# Patient Record
Sex: MALE | Race: BLACK OR AFRICAN AMERICAN | NOT HISPANIC OR LATINO | Employment: UNEMPLOYED | URBAN - METROPOLITAN AREA
[De-identification: names, ages, dates, MRNs, and addresses within clinical notes are randomized per-mention and may not be internally consistent; named-entity substitution may affect disease eponyms.]

---

## 2021-04-22 ENCOUNTER — HOSPITAL ENCOUNTER (OUTPATIENT)
Dept: VACCINE CLINIC | Facility: HOSPITAL | Age: 31
Discharge: HOME | End: 2021-04-22
Attending: INTERNAL MEDICINE

## 2021-05-13 ENCOUNTER — HOSPITAL ENCOUNTER (OUTPATIENT)
Dept: VACCINE CLINIC | Facility: HOSPITAL | Age: 31
Discharge: HOME | End: 2021-05-13
Attending: INTERNAL MEDICINE

## 2022-09-15 ENCOUNTER — HOSPITAL ENCOUNTER (EMERGENCY)
Facility: HOSPITAL | Age: 32
Discharge: HOME | End: 2022-09-15
Attending: EMERGENCY MEDICINE | Admitting: EMERGENCY MEDICINE

## 2022-09-15 VITALS
RESPIRATION RATE: 20 BRPM | BODY MASS INDEX: 38.02 KG/M2 | WEIGHT: 250.88 LBS | HEIGHT: 68 IN | HEART RATE: 94 BPM | OXYGEN SATURATION: 97 % | SYSTOLIC BLOOD PRESSURE: 131 MMHG | DIASTOLIC BLOOD PRESSURE: 76 MMHG | TEMPERATURE: 98.3 F

## 2022-09-15 DIAGNOSIS — M25.474 BILATERAL SWELLING OF FEET AND ANKLES: Primary | ICD-10-CM

## 2022-09-15 DIAGNOSIS — M25.471 BILATERAL SWELLING OF FEET AND ANKLES: Primary | ICD-10-CM

## 2022-09-15 DIAGNOSIS — M25.472 BILATERAL SWELLING OF FEET AND ANKLES: Primary | ICD-10-CM

## 2022-09-15 DIAGNOSIS — M25.475 BILATERAL SWELLING OF FEET AND ANKLES: Primary | ICD-10-CM

## 2022-09-15 LAB
ALBUMIN SERPL-MCNC: 4.8 G/DL (ref 3.5–5.2)
ALBUMIN/GLOB SERPL: 1.3 G/DL
ALP SERPL-CCNC: 84 U/L (ref 39–117)
ALT SERPL W P-5'-P-CCNC: 130 U/L (ref 1–41)
ANION GAP SERPL CALCULATED.3IONS-SCNC: 10.9 MMOL/L (ref 5–15)
AST SERPL-CCNC: 74 U/L (ref 1–40)
BASOPHILS # BLD AUTO: 0.03 10*3/MM3 (ref 0–0.2)
BASOPHILS NFR BLD AUTO: 0.4 % (ref 0–1.5)
BILIRUB SERPL-MCNC: 0.5 MG/DL (ref 0–1.2)
BILIRUB UR QL STRIP: NEGATIVE
BUN SERPL-MCNC: 8 MG/DL (ref 6–20)
BUN/CREAT SERPL: 7.6 (ref 7–25)
CALCIUM SPEC-SCNC: 10.3 MG/DL (ref 8.6–10.5)
CHLORIDE SERPL-SCNC: 98 MMOL/L (ref 98–107)
CLARITY UR: CLEAR
CO2 SERPL-SCNC: 26.1 MMOL/L (ref 22–29)
COLOR UR: YELLOW
CREAT SERPL-MCNC: 1.05 MG/DL (ref 0.76–1.27)
DEPRECATED RDW RBC AUTO: 47.1 FL (ref 37–54)
EGFRCR SERPLBLD CKD-EPI 2021: 96.7 ML/MIN/1.73
EOSINOPHIL # BLD AUTO: 0.15 10*3/MM3 (ref 0–0.4)
EOSINOPHIL NFR BLD AUTO: 2.1 % (ref 0.3–6.2)
ERYTHROCYTE [DISTWIDTH] IN BLOOD BY AUTOMATED COUNT: 13.2 % (ref 12.3–15.4)
GLOBULIN UR ELPH-MCNC: 3.6 GM/DL
GLUCOSE SERPL-MCNC: 120 MG/DL (ref 65–99)
GLUCOSE UR STRIP-MCNC: NEGATIVE MG/DL
HCT VFR BLD AUTO: 50.1 % (ref 37.5–51)
HGB BLD-MCNC: 17.8 G/DL (ref 13–17.7)
HGB UR QL STRIP.AUTO: NEGATIVE
HOLD SPECIMEN: 11
HOLD SPECIMEN: 11
IMM GRANULOCYTES # BLD AUTO: 0.03 10*3/MM3 (ref 0–0.05)
IMM GRANULOCYTES NFR BLD AUTO: 0.4 % (ref 0–0.5)
KETONES UR QL STRIP: NEGATIVE
LEUKOCYTE ESTERASE UR QL STRIP.AUTO: NEGATIVE
LIPASE SERPL-CCNC: 31 U/L (ref 13–60)
LYMPHOCYTES # BLD AUTO: 1.64 10*3/MM3 (ref 0.7–3.1)
LYMPHOCYTES NFR BLD AUTO: 22.6 % (ref 19.6–45.3)
MCH RBC QN AUTO: 34.5 PG (ref 26.6–33)
MCHC RBC AUTO-ENTMCNC: 35.5 G/DL (ref 31.5–35.7)
MCV RBC AUTO: 97.1 FL (ref 79–97)
MONOCYTES # BLD AUTO: 0.46 10*3/MM3 (ref 0.1–0.9)
MONOCYTES NFR BLD AUTO: 6.3 % (ref 5–12)
NEUTROPHILS NFR BLD AUTO: 4.95 10*3/MM3 (ref 1.7–7)
NEUTROPHILS NFR BLD AUTO: 68.2 % (ref 42.7–76)
NITRITE UR QL STRIP: NEGATIVE
NRBC BLD AUTO-RTO: 0 /100 WBC (ref 0–0.2)
NT-PROBNP SERPL-MCNC: <5 PG/ML (ref 0–450)
PH UR STRIP.AUTO: 7.5 [PH] (ref 5–8)
PLATELET # BLD AUTO: 193 10*3/MM3 (ref 140–450)
PMV BLD AUTO: 10.2 FL (ref 6–12)
POTASSIUM SERPL-SCNC: 3.9 MMOL/L (ref 3.5–5.2)
PROT SERPL-MCNC: 8.4 G/DL (ref 6–8.5)
PROT UR QL STRIP: NEGATIVE
RBC # BLD AUTO: 5.16 10*6/MM3 (ref 4.14–5.8)
SODIUM SERPL-SCNC: 135 MMOL/L (ref 136–145)
SP GR UR STRIP: 1.01 (ref 1–1.03)
UROBILINOGEN UR QL STRIP: NORMAL
WBC NRBC COR # BLD: 7.26 10*3/MM3 (ref 3.4–10.8)
WHOLE BLOOD HOLD COAG: 11
WHOLE BLOOD HOLD SPECIMEN: 11

## 2022-09-15 PROCEDURE — 85025 COMPLETE CBC W/AUTO DIFF WBC: CPT

## 2022-09-15 PROCEDURE — 81003 URINALYSIS AUTO W/O SCOPE: CPT

## 2022-09-15 PROCEDURE — 99283 EMERGENCY DEPT VISIT LOW MDM: CPT

## 2022-09-15 PROCEDURE — P9612 CATHETERIZE FOR URINE SPEC: HCPCS

## 2022-09-15 PROCEDURE — 83880 ASSAY OF NATRIURETIC PEPTIDE: CPT

## 2022-09-15 PROCEDURE — 83690 ASSAY OF LIPASE: CPT

## 2022-09-15 PROCEDURE — 80053 COMPREHEN METABOLIC PANEL: CPT

## 2022-09-15 PROCEDURE — 36415 COLL VENOUS BLD VENIPUNCTURE: CPT

## 2022-09-15 NOTE — DISCHARGE INSTRUCTIONS
Please drink lots of water and decrease your sodium intake  Please elevate your legs on pillows at heart level    Please follow-up with your PCP in 3 days    If any worsening symptoms or new Concerns please return to the ED

## 2022-09-15 NOTE — ED PROVIDER NOTES
Subjective   History of Present Illness  Patient is a 32-year-old male presenting today due to bilateral feet swelling x1 day.  Patient states that he eats a lot of food high in sodium daily.  Yesterday he was telling his mother that his feet were swelling and she gave him one of her Lasix pills.  Patient denies a history of heart failure, liver disease or CKD.  He states he has a history of anxiety.  Patient states that he has been smoking a pack per day for over 14 years.   He denies abdominal pain.  He denies fever, chills, nausea or vomiting.    History provided by:  Patient   used: No        Review of Systems   Constitutional: Negative.    HENT: Negative.    Eyes: Negative.    Respiratory: Negative.    Cardiovascular: Positive for leg swelling (ankle/feet).   Gastrointestinal: Negative.    Endocrine: Negative.    Genitourinary: Negative.    Musculoskeletal: Negative.    Skin: Negative.    Allergic/Immunologic: Negative.    Neurological: Negative.    Hematological: Negative.    Psychiatric/Behavioral: Negative.        Past Medical History:   Diagnosis Date   • Anxiety        No Known Allergies    No past surgical history on file.    No family history on file.    Social History     Socioeconomic History   • Marital status: Single   Tobacco Use   • Smoking status: Current Every Day Smoker     Packs/day: 1.00     Types: Cigarettes   • Smokeless tobacco: Never Used   Substance and Sexual Activity   • Alcohol use: Not Currently   • Drug use: Never   • Sexual activity: Defer           Objective   Physical Exam  Vitals and nursing note reviewed.   Constitutional:       Appearance: Normal appearance. He is normal weight.   HENT:      Head: Normocephalic and atraumatic.      Nose: Nose normal.      Mouth/Throat:      Mouth: Mucous membranes are moist.   Eyes:      Extraocular Movements: Extraocular movements intact.      Conjunctiva/sclera: Conjunctivae normal.      Pupils: Pupils are equal, round,  and reactive to light.   Cardiovascular:      Rate and Rhythm: Normal rate and regular rhythm.      Pulses: Normal pulses.      Heart sounds: Normal heart sounds.   Pulmonary:      Effort: Pulmonary effort is normal.      Breath sounds: Normal breath sounds.   Musculoskeletal:         General: Swelling present. No tenderness or signs of injury. Normal range of motion.      Cervical back: Normal range of motion and neck supple.      Right ankle: No swelling. Normal pulse.      Left ankle: No swelling. Normal pulse.      Right foot: No swelling.      Left foot: No swelling.   Skin:     General: Skin is warm and dry.   Neurological:      General: No focal deficit present.      Mental Status: He is alert and oriented to person, place, and time.   Psychiatric:         Mood and Affect: Mood normal.         Behavior: Behavior normal.         Procedures           ED Course                                           MDM  Number of Diagnoses or Management Options  Diagnosis management comments: I have spoken with patient. I have explained the patient´s condition, diagnoses and treatment plan based on the information available to me at this time. I have answered the patient's questions and addressed any concerns. The patient has a good  understanding of the patient´s diagnosis, condition, and treatment plan as can be expected at this point. The vital signs have been stable. The patient´s condition is stable and appropriate for discharge from the emergency department.      The patient will pursue further outpatient evaluation with the primary care physician or other designated or consulting physician as outlined in the discharge instructions. They are agreeable to this plan of care and follow-up instructions have been explained in detail. The patient has received these instructions in written format and have expressed an understanding of the discharge instructions. The patient is aware that any significant change in condition or  worsening of symptoms should prompt an immediate return to this or the closest emergency department or call to 911.         Amount and/or Complexity of Data Reviewed  Clinical lab tests: reviewed  Tests in the medicine section of CPT®: reviewed  Decide to obtain previous medical records or to obtain history from someone other than the patient: yes    Risk of Complications, Morbidity, and/or Mortality  Presenting problems: low  Diagnostic procedures: low  Management options: low    Patient Progress  Patient progress: stable      Final diagnoses:   Bilateral swelling of feet and ankles       ED Disposition  ED Disposition     ED Disposition   Discharge    Condition   Stable    Comment   --             Stephanie Abel, APRN  [address] [address]  [phone]      If symptoms worsen         Medication List      No changes were made to your prescriptions during this visit.          Rebel Augustin PAPATTI  09/15/22 1002

## 2022-11-14 ENCOUNTER — TRANSCRIBE ORDERS (OUTPATIENT)
Dept: LAB | Facility: HOSPITAL | Age: 32
End: 2022-11-14

## 2022-11-14 ENCOUNTER — LAB (OUTPATIENT)
Dept: LAB | Facility: HOSPITAL | Age: 32
End: 2022-11-14

## 2022-11-14 DIAGNOSIS — F20.89 OTHER SCHIZOPHRENIA: ICD-10-CM

## 2022-11-14 DIAGNOSIS — G47.00 INSOMNIA, UNSPECIFIED TYPE: ICD-10-CM

## 2022-11-14 DIAGNOSIS — F20.89 OTHER SCHIZOPHRENIA: Primary | ICD-10-CM

## 2022-11-14 LAB
ALBUMIN SERPL-MCNC: 4.1 G/DL (ref 3.5–5.2)
ALBUMIN/GLOB SERPL: 1.2 G/DL
ALP SERPL-CCNC: 88 U/L (ref 39–117)
ALT SERPL W P-5'-P-CCNC: 91 U/L (ref 1–41)
ANION GAP SERPL CALCULATED.3IONS-SCNC: 14.1 MMOL/L (ref 5–15)
AST SERPL-CCNC: 65 U/L (ref 1–40)
BASOPHILS # BLD AUTO: 0.05 10*3/MM3 (ref 0–0.2)
BASOPHILS NFR BLD AUTO: 0.7 % (ref 0–1.5)
BILIRUB SERPL-MCNC: 1 MG/DL (ref 0–1.2)
BUN SERPL-MCNC: 4 MG/DL (ref 6–20)
BUN/CREAT SERPL: 4.7 (ref 7–25)
CALCIUM SPEC-SCNC: 9.5 MG/DL (ref 8.6–10.5)
CHLORIDE SERPL-SCNC: 101 MMOL/L (ref 98–107)
CHOLEST SERPL-MCNC: 125 MG/DL (ref 0–200)
CO2 SERPL-SCNC: 25.9 MMOL/L (ref 22–29)
CREAT SERPL-MCNC: 0.86 MG/DL (ref 0.76–1.27)
DEPRECATED RDW RBC AUTO: 43.3 FL (ref 37–54)
EGFRCR SERPLBLD CKD-EPI 2021: 118 ML/MIN/1.73
EOSINOPHIL # BLD AUTO: 0.13 10*3/MM3 (ref 0–0.4)
EOSINOPHIL NFR BLD AUTO: 1.9 % (ref 0.3–6.2)
ERYTHROCYTE [DISTWIDTH] IN BLOOD BY AUTOMATED COUNT: 13 % (ref 12.3–15.4)
GLOBULIN UR ELPH-MCNC: 3.3 GM/DL
GLUCOSE SERPL-MCNC: 108 MG/DL (ref 65–99)
HCT VFR BLD AUTO: 47.2 % (ref 37.5–51)
HDLC SERPL-MCNC: 29 MG/DL (ref 40–60)
HGB BLD-MCNC: 16.8 G/DL (ref 13–17.7)
IMM GRANULOCYTES # BLD AUTO: 0.02 10*3/MM3 (ref 0–0.05)
IMM GRANULOCYTES NFR BLD AUTO: 0.3 % (ref 0–0.5)
LDLC SERPL CALC-MCNC: 46 MG/DL (ref 0–100)
LDLC/HDLC SERPL: 1.06 {RATIO}
LYMPHOCYTES # BLD AUTO: 1.32 10*3/MM3 (ref 0.7–3.1)
LYMPHOCYTES NFR BLD AUTO: 19.8 % (ref 19.6–45.3)
MCH RBC QN AUTO: 32.7 PG (ref 26.6–33)
MCHC RBC AUTO-ENTMCNC: 35.6 G/DL (ref 31.5–35.7)
MCV RBC AUTO: 92 FL (ref 79–97)
MONOCYTES # BLD AUTO: 0.41 10*3/MM3 (ref 0.1–0.9)
MONOCYTES NFR BLD AUTO: 6.1 % (ref 5–12)
NEUTROPHILS NFR BLD AUTO: 4.74 10*3/MM3 (ref 1.7–7)
NEUTROPHILS NFR BLD AUTO: 71.2 % (ref 42.7–76)
NRBC BLD AUTO-RTO: 0 /100 WBC (ref 0–0.2)
PLATELET # BLD AUTO: 166 10*3/MM3 (ref 140–450)
PMV BLD AUTO: 11.6 FL (ref 6–12)
POTASSIUM SERPL-SCNC: 3.7 MMOL/L (ref 3.5–5.2)
PROT SERPL-MCNC: 7.4 G/DL (ref 6–8.5)
RBC # BLD AUTO: 5.13 10*6/MM3 (ref 4.14–5.8)
SODIUM SERPL-SCNC: 141 MMOL/L (ref 136–145)
T4 FREE SERPL-MCNC: 1.41 NG/DL (ref 0.93–1.7)
TRIGL SERPL-MCNC: 327 MG/DL (ref 0–150)
TSH SERPL DL<=0.05 MIU/L-ACNC: 1.2 UIU/ML (ref 0.27–4.2)
VLDLC SERPL-MCNC: 50 MG/DL (ref 5–40)
WBC NRBC COR # BLD: 6.67 10*3/MM3 (ref 3.4–10.8)

## 2022-11-14 PROCEDURE — 84439 ASSAY OF FREE THYROXINE: CPT

## 2022-11-14 PROCEDURE — 84443 ASSAY THYROID STIM HORMONE: CPT

## 2022-11-14 PROCEDURE — 36415 COLL VENOUS BLD VENIPUNCTURE: CPT

## 2022-11-14 PROCEDURE — 85025 COMPLETE CBC W/AUTO DIFF WBC: CPT

## 2022-11-14 PROCEDURE — 80061 LIPID PANEL: CPT

## 2022-11-14 PROCEDURE — 80053 COMPREHEN METABOLIC PANEL: CPT

## 2023-01-01 ENCOUNTER — APPOINTMENT (OUTPATIENT)
Dept: CT IMAGING | Facility: HOSPITAL | Age: 33
End: 2023-01-01
Payer: COMMERCIAL

## 2023-01-01 ENCOUNTER — APPOINTMENT (OUTPATIENT)
Dept: CARDIOLOGY | Facility: HOSPITAL | Age: 33
End: 2023-01-01
Payer: COMMERCIAL

## 2023-01-01 ENCOUNTER — APPOINTMENT (OUTPATIENT)
Dept: GENERAL RADIOLOGY | Facility: HOSPITAL | Age: 33
End: 2023-01-01
Payer: COMMERCIAL

## 2023-01-01 ENCOUNTER — APPOINTMENT (OUTPATIENT)
Dept: MRI IMAGING | Facility: HOSPITAL | Age: 33
End: 2023-01-01
Payer: COMMERCIAL

## 2023-01-01 ENCOUNTER — HOSPITAL ENCOUNTER (INPATIENT)
Facility: HOSPITAL | Age: 33
LOS: 3 days | End: 2023-12-17
Attending: EMERGENCY MEDICINE | Admitting: STUDENT IN AN ORGANIZED HEALTH CARE EDUCATION/TRAINING PROGRAM
Payer: COMMERCIAL

## 2023-01-01 ENCOUNTER — APPOINTMENT (OUTPATIENT)
Dept: NUCLEAR MEDICINE | Facility: HOSPITAL | Age: 33
End: 2023-01-01
Payer: COMMERCIAL

## 2023-01-01 VITALS
DIASTOLIC BLOOD PRESSURE: 71 MMHG | BODY MASS INDEX: 41.52 KG/M2 | WEIGHT: 264.55 LBS | RESPIRATION RATE: 24 BRPM | HEIGHT: 67 IN | TEMPERATURE: 98.5 F | SYSTOLIC BLOOD PRESSURE: 107 MMHG | HEART RATE: 51 BPM | OXYGEN SATURATION: 93 %

## 2023-01-01 DIAGNOSIS — T17.320A CHOKING DUE TO FOOD IN LARYNX, INITIAL ENCOUNTER: ICD-10-CM

## 2023-01-01 DIAGNOSIS — I48.91 ATRIAL FIBRILLATION WITH RAPID VENTRICULAR RESPONSE: ICD-10-CM

## 2023-01-01 DIAGNOSIS — I25.9 MYOCARDIAL ISCHEMIA: ICD-10-CM
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
## 2023-01-01 DIAGNOSIS — I49.3 FREQUENT PVCS: ICD-10-CM

## 2023-01-01 DIAGNOSIS — I46.9 CARDIAC ARREST: Primary | ICD-10-CM

## 2023-01-01 DIAGNOSIS — E87.20 ACIDOSIS, UNSPECIFIED: ICD-10-CM

## 2023-01-01 LAB
ACB CMPLX DNA BAL NAA+NON-PRB-NCNCRNG: NOT DETECTED
ALBUMIN SERPL-MCNC: 3.1 G/DL (ref 3.5–5.2)
ALBUMIN SERPL-MCNC: 3.2 G/DL (ref 3.5–5.2)
ALBUMIN SERPL-MCNC: 3.3 G/DL (ref 3.5–5.2)
ALBUMIN/GLOB SERPL: 1.1 G/DL
ALBUMIN/GLOB SERPL: 1.3 G/DL
ALP SERPL-CCNC: 64 U/L (ref 39–117)
ALP SERPL-CCNC: 66 U/L (ref 39–117)
ALP SERPL-CCNC: 81 U/L (ref 39–117)
ALT SERPL W P-5'-P-CCNC: 38 U/L (ref 1–41)
ALT SERPL W P-5'-P-CCNC: 61 U/L (ref 1–41)
ALT SERPL W P-5'-P-CCNC: 62 U/L (ref 1–41)
ANION GAP SERPL CALCULATED.3IONS-SCNC: 10.3 MMOL/L (ref 5–15)
ANION GAP SERPL CALCULATED.3IONS-SCNC: 10.5 MMOL/L (ref 5–15)
ANION GAP SERPL CALCULATED.3IONS-SCNC: 10.8 MMOL/L (ref 5–15)
ANION GAP SERPL CALCULATED.3IONS-SCNC: 11 MMOL/L (ref 5–15)
ANION GAP SERPL CALCULATED.3IONS-SCNC: 13.3 MMOL/L (ref 5–15)
ANION GAP SERPL CALCULATED.3IONS-SCNC: 22.4 MMOL/L (ref 5–15)
ANION GAP SERPL CALCULATED.3IONS-SCNC: 8.5 MMOL/L (ref 5–15)
ANION GAP SERPL CALCULATED.3IONS-SCNC: 9.7 MMOL/L (ref 5–15)
ANION GAP SERPL CALCULATED.3IONS-SCNC: 9.9 MMOL/L (ref 5–15)
ARTERIAL PATENCY WRIST A: ABNORMAL
ARTERIAL PATENCY WRIST A: POSITIVE
ASCENDING AORTA: 3.2 CM
AST SERPL-CCNC: 35 U/L (ref 1–40)
AST SERPL-CCNC: 38 U/L (ref 1–40)
AST SERPL-CCNC: 67 U/L (ref 1–40)
BACTERIA SPEC AEROBE CULT: ABNORMAL
BACTERIA SPEC AEROBE CULT: ABNORMAL
BACTERIA SPEC RESP CULT: NORMAL
BASE EXCESS BLDA CALC-SCNC: -13.8 MMOL/L (ref -2–2)
BASE EXCESS BLDA CALC-SCNC: -19.7 MMOL/L (ref -2–2)
BASE EXCESS BLDA CALC-SCNC: -4.6 MMOL/L (ref -2–2)
BASE EXCESS BLDA CALC-SCNC: -5.3 MMOL/L (ref -2–2)
BASE EXCESS BLDA CALC-SCNC: -5.4 MMOL/L (ref -2–2)
BASOPHILS # BLD AUTO: 0.03 10*3/MM3 (ref 0–0.2)
BASOPHILS # BLD AUTO: 0.09 10*3/MM3 (ref 0–0.2)
BASOPHILS NFR BLD AUTO: 0.4 % (ref 0–1.5)
BASOPHILS NFR BLD AUTO: 0.9 % (ref 0–1.5)
BDY SITE: ABNORMAL
BH CV ECHO MEAS - AO MAX PG: 3 MMHG
BH CV ECHO MEAS - AO MEAN PG: 2 MMHG
BH CV ECHO MEAS - AO ROOT DIAM: 3.7 CM
BH CV ECHO MEAS - AO V2 MAX: 83 CM/SEC
BH CV ECHO MEAS - AO V2 VTI: 17.4 CM
BH CV ECHO MEAS - AVA(I,D): 2.08 CM2
BH CV ECHO MEAS - EDV(CUBED): 113.4 ML
BH CV ECHO MEAS - EDV(MOD-SP2): 84.5 ML
BH CV ECHO MEAS - EDV(MOD-SP4): 116 ML
BH CV ECHO MEAS - EF(MOD-SP2): 41.3 %
BH CV ECHO MEAS - EF(MOD-SP4): 47.3 %
BH CV ECHO MEAS - ESV(CUBED): 60.2 ML
BH CV ECHO MEAS - ESV(MOD-SP2): 49.6 ML
BH CV ECHO MEAS - ESV(MOD-SP4): 61.1 ML
BH CV ECHO MEAS - FS: 19 %
BH CV ECHO MEAS - IVS/LVPW: 1.13 CM
BH CV ECHO MEAS - IVSD: 1.48 CM
BH CV ECHO MEAS - LA DIMENSION: 3.6 CM
BH CV ECHO MEAS - LAT PEAK E' VEL: 5.6 CM/SEC
BH CV ECHO MEAS - LV MASS(C)D: 275.8 GRAMS
BH CV ECHO MEAS - LV MAX PG: 1.35 MMHG
BH CV ECHO MEAS - LV MEAN PG: 1 MMHG
BH CV ECHO MEAS - LV V1 MAX: 58.1 CM/SEC
BH CV ECHO MEAS - LV V1 VTI: 11.5 CM
BH CV ECHO MEAS - LVIDD: 4.8 CM
BH CV ECHO MEAS - LVIDS: 3.9 CM
BH CV ECHO MEAS - LVOT AREA: 3.1 CM2
BH CV ECHO MEAS - LVOT DIAM: 2 CM
BH CV ECHO MEAS - LVPWD: 1.31 CM
BH CV ECHO MEAS - MED PEAK E' VEL: 5 CM/SEC
BH CV ECHO MEAS - MV A MAX VEL: 72.4 CM/SEC
BH CV ECHO MEAS - MV DEC SLOPE: 300.5 CM/SEC2
BH CV ECHO MEAS - MV DEC TIME: 0.19 SEC
BH CV ECHO MEAS - MV E MAX VEL: 56.6 CM/SEC
BH CV ECHO MEAS - MV E/A: 0.78
BH CV ECHO MEAS - MV MAX PG: 2.8 MMHG
BH CV ECHO MEAS - MV MEAN PG: 1 MMHG
BH CV ECHO MEAS - MV P1/2T: 76.2 MSEC
BH CV ECHO MEAS - MV V2 VTI: 28.2 CM
BH CV ECHO MEAS - MVA(P1/2T): 2.9 CM2
BH CV ECHO MEAS - MVA(VTI): 1.28 CM2
BH CV ECHO MEAS - RVDD: 2.26 CM
BH CV ECHO MEAS - SV(LVOT): 36.1 ML
BH CV ECHO MEAS - SV(MOD-SP2): 34.9 ML
BH CV ECHO MEAS - SV(MOD-SP4): 54.9 ML
BH CV ECHO MEASUREMENTS AVERAGE E/E' RATIO: 10.68
BILIRUB CONJ SERPL-MCNC: 3.7 MG/DL (ref 0–0.3)
BILIRUB INDIRECT SERPL-MCNC: 0.4 MG/DL
BILIRUB SERPL-MCNC: 0.3 MG/DL (ref 0–1.2)
BILIRUB SERPL-MCNC: 1.2 MG/DL (ref 0–1.2)
BILIRUB SERPL-MCNC: 4.1 MG/DL (ref 0–1.2)
BILIRUB UR QL STRIP: NEGATIVE
BLACTX-M ISLT/SPM QL: NOT DETECTED
BLAIMP ISLT/SPM QL: NOT DETECTED
BLAKPC ISLT/SPM QL: NOT DETECTED
BLAOXA-48-LIKE ISLT/SPM QL: NOT DETECTED
BLAVIM ISLT/SPM QL: NOT DETECTED
BUN SERPL-MCNC: 11 MG/DL (ref 6–20)
BUN SERPL-MCNC: 4 MG/DL (ref 6–20)
BUN SERPL-MCNC: 5 MG/DL (ref 6–20)
BUN SERPL-MCNC: 6 MG/DL (ref 6–20)
BUN SERPL-MCNC: 7 MG/DL (ref 6–20)
BUN SERPL-MCNC: 7 MG/DL (ref 6–20)
BUN SERPL-MCNC: 8 MG/DL (ref 6–20)
BUN/CREAT SERPL: 12.5 (ref 7–25)
BUN/CREAT SERPL: 3.5 (ref 7–25)
BUN/CREAT SERPL: 4.2 (ref 7–25)
BUN/CREAT SERPL: 5.3 (ref 7–25)
BUN/CREAT SERPL: 6.3 (ref 7–25)
BUN/CREAT SERPL: 6.4 (ref 7–25)
BUN/CREAT SERPL: 7 (ref 7–25)
BUN/CREAT SERPL: 7.7 (ref 7–25)
BUN/CREAT SERPL: 7.9 (ref 7–25)
C PNEUM DNA NPH QL NAA+NON-PROBE: NOT DETECTED
CA-I BLDA-SCNC: 1.15 MMOL/L (ref 1.13–1.32)
CA-I BLDA-SCNC: 1.17 MMOL/L (ref 1.13–1.32)
CA-I BLDA-SCNC: 1.2 MMOL/L (ref 1.13–1.32)
CA-I BLDA-SCNC: 1.23 MMOL/L (ref 1.13–1.32)
CALCIUM SPEC-SCNC: 10.1 MG/DL (ref 8.6–10.5)
CALCIUM SPEC-SCNC: 8.2 MG/DL (ref 8.6–10.5)
CALCIUM SPEC-SCNC: 8.2 MG/DL (ref 8.6–10.5)
CALCIUM SPEC-SCNC: 8.3 MG/DL (ref 8.6–10.5)
CALCIUM SPEC-SCNC: 8.3 MG/DL (ref 8.6–10.5)
CALCIUM SPEC-SCNC: 8.4 MG/DL (ref 8.6–10.5)
CALCIUM SPEC-SCNC: 8.5 MG/DL (ref 8.6–10.5)
CALCIUM SPEC-SCNC: 8.6 MG/DL (ref 8.6–10.5)
CALCIUM SPEC-SCNC: 8.7 MG/DL (ref 8.6–10.5)
CHLORIDE BLDA-SCNC: 100 MMOL/L (ref 98–106)
CHLORIDE BLDA-SCNC: 108 MMOL/L (ref 98–106)
CHLORIDE BLDA-SCNC: 109 MMOL/L (ref 98–106)
CHLORIDE BLDA-SCNC: 115 MMOL/L (ref 98–106)
CHLORIDE SERPL-SCNC: 102 MMOL/L (ref 98–107)
CHLORIDE SERPL-SCNC: 106 MMOL/L (ref 98–107)
CHLORIDE SERPL-SCNC: 108 MMOL/L (ref 98–107)
CHLORIDE SERPL-SCNC: 110 MMOL/L (ref 98–107)
CHLORIDE SERPL-SCNC: 116 MMOL/L (ref 98–107)
CHLORIDE SERPL-SCNC: 118 MMOL/L (ref 98–107)
CHLORIDE SERPL-SCNC: 120 MMOL/L (ref 98–107)
CLARITY UR: CLEAR
CO2 SERPL-SCNC: 14.6 MMOL/L (ref 22–29)
CO2 SERPL-SCNC: 20.7 MMOL/L (ref 22–29)
CO2 SERPL-SCNC: 22.2 MMOL/L (ref 22–29)
CO2 SERPL-SCNC: 22.5 MMOL/L (ref 22–29)
CO2 SERPL-SCNC: 22.5 MMOL/L (ref 22–29)
CO2 SERPL-SCNC: 22.7 MMOL/L (ref 22–29)
CO2 SERPL-SCNC: 23 MMOL/L (ref 22–29)
CO2 SERPL-SCNC: 23.1 MMOL/L (ref 22–29)
CO2 SERPL-SCNC: 23.3 MMOL/L (ref 22–29)
COHGB MFR BLD: 0.1 % (ref 0–1.5)
COHGB MFR BLD: 0.2 % (ref 0–1.5)
COHGB MFR BLD: 0.3 % (ref 0–1.5)
COHGB MFR BLD: 1.9 % (ref 0–1.5)
COHGB MFR BLD: 6.1 % (ref 0–1.5)
COLOR UR: YELLOW
CREAT SERPL-MCNC: 0.88 MG/DL (ref 0.76–1.27)
CREAT SERPL-MCNC: 1 MG/DL (ref 0.76–1.27)
CREAT SERPL-MCNC: 1.01 MG/DL (ref 0.76–1.27)
CREAT SERPL-MCNC: 1.04 MG/DL (ref 0.76–1.27)
CREAT SERPL-MCNC: 1.09 MG/DL (ref 0.76–1.27)
CREAT SERPL-MCNC: 1.13 MG/DL (ref 0.76–1.27)
CREAT SERPL-MCNC: 1.15 MG/DL (ref 0.76–1.27)
CREAT SERPL-MCNC: 1.19 MG/DL (ref 0.76–1.27)
CREAT SERPL-MCNC: 1.26 MG/DL (ref 0.76–1.27)
D-LACTATE SERPL-SCNC: 0.9 MMOL/L (ref 0.5–2)
D-LACTATE SERPL-SCNC: 1.9 MMOL/L (ref 0.5–2)
D-LACTATE SERPL-SCNC: 12.1 MMOL/L (ref 0.5–2)
D-LACTATE SERPL-SCNC: 2.1 MMOL/L (ref 0.5–2)
D-LACTATE SERPL-SCNC: 2.5 MMOL/L (ref 0.5–2)
D-LACTATE SERPL-SCNC: 7 MMOL/L (ref 0.5–2)
DEPRECATED RDW RBC AUTO: 46.5 FL (ref 37–54)
DEPRECATED RDW RBC AUTO: 49.3 FL (ref 37–54)
DEPRECATED RDW RBC AUTO: 51.8 FL (ref 37–54)
DEPRECATED RDW RBC AUTO: 57.1 FL (ref 37–54)
E CLOAC COMP DNA BAL NAA+NON-PRB-NCNCRNG: DETECTED
E COLI DNA BAL NAA+NON-PRB-NCNCRNG: NOT DETECTED
EGFRCR SERPLBLD CKD-EPI 2021: 100.7 ML/MIN/1.73
EGFRCR SERPLBLD CKD-EPI 2021: 101.9 ML/MIN/1.73
EGFRCR SERPLBLD CKD-EPI 2021: 116.4 ML/MIN/1.73
EGFRCR SERPLBLD CKD-EPI 2021: 77.2 ML/MIN/1.73
EGFRCR SERPLBLD CKD-EPI 2021: 82.7 ML/MIN/1.73
EGFRCR SERPLBLD CKD-EPI 2021: 86.2 ML/MIN/1.73
EGFRCR SERPLBLD CKD-EPI 2021: 88 ML/MIN/1.73
EGFRCR SERPLBLD CKD-EPI 2021: 91.9 ML/MIN/1.73
EGFRCR SERPLBLD CKD-EPI 2021: 97.2 ML/MIN/1.73
EOSINOPHIL # BLD AUTO: 0.14 10*3/MM3 (ref 0–0.4)
EOSINOPHIL # BLD AUTO: 0.16 10*3/MM3 (ref 0–0.4)
EOSINOPHIL NFR BLD AUTO: 1.4 % (ref 0.3–6.2)
EOSINOPHIL NFR BLD AUTO: 2.1 % (ref 0.3–6.2)
ERYTHROCYTE [DISTWIDTH] IN BLOOD BY AUTOMATED COUNT: 13 % (ref 12.3–15.4)
ERYTHROCYTE [DISTWIDTH] IN BLOOD BY AUTOMATED COUNT: 13.4 % (ref 12.3–15.4)
ERYTHROCYTE [DISTWIDTH] IN BLOOD BY AUTOMATED COUNT: 13.5 % (ref 12.3–15.4)
ERYTHROCYTE [DISTWIDTH] IN BLOOD BY AUTOMATED COUNT: 15.9 % (ref 12.3–15.4)
FHHB: 1.3 % (ref 0–5)
FHHB: 2 % (ref 0–5)
FHHB: 24.9 % (ref 0–5)
FHHB: 3.9 % (ref 0–5)
FHHB: 4.1 % (ref 0–5)
FLUAV SUBTYP SPEC NAA+PROBE: NOT DETECTED
FLUBV RNA ISLT QL NAA+PROBE: NOT DETECTED
GAS FLOW AIRWAY: ABNORMAL L/MIN
GEN 5 2HR TROPONIN T REFLEX: 122 NG/L
GLOBULIN UR ELPH-MCNC: 2.5 GM/DL
GLOBULIN UR ELPH-MCNC: 2.9 GM/DL
GLUCOSE BLDA-MCNC: 119 MG/DL (ref 70–99)
GLUCOSE BLDA-MCNC: 371 MG/DL (ref 70–99)
GLUCOSE BLDA-MCNC: 82 MG/DL (ref 70–99)
GLUCOSE BLDA-MCNC: 98 MG/DL (ref 70–99)
GLUCOSE BLDC GLUCOMTR-MCNC: 104 MG/DL (ref 70–99)
GLUCOSE BLDC GLUCOMTR-MCNC: 106 MG/DL (ref 70–99)
GLUCOSE BLDC GLUCOMTR-MCNC: 113 MG/DL (ref 70–99)
GLUCOSE BLDC GLUCOMTR-MCNC: 115 MG/DL (ref 70–99)
GLUCOSE BLDC GLUCOMTR-MCNC: 118 MG/DL (ref 70–99)
GLUCOSE BLDC GLUCOMTR-MCNC: 119 MG/DL (ref 70–99)
GLUCOSE BLDC GLUCOMTR-MCNC: 120 MG/DL (ref 70–99)
GLUCOSE BLDC GLUCOMTR-MCNC: 123 MG/DL (ref 70–99)
GLUCOSE BLDC GLUCOMTR-MCNC: 127 MG/DL (ref 70–99)
GLUCOSE BLDC GLUCOMTR-MCNC: 128 MG/DL (ref 70–99)
GLUCOSE BLDC GLUCOMTR-MCNC: 129 MG/DL (ref 70–99)
GLUCOSE BLDC GLUCOMTR-MCNC: 136 MG/DL (ref 70–99)
GLUCOSE BLDC GLUCOMTR-MCNC: 155 MG/DL (ref 70–99)
GLUCOSE BLDC GLUCOMTR-MCNC: 99 MG/DL (ref 70–99)
GLUCOSE SERPL-MCNC: 101 MG/DL (ref 65–99)
GLUCOSE SERPL-MCNC: 115 MG/DL (ref 65–99)
GLUCOSE SERPL-MCNC: 120 MG/DL (ref 65–99)
GLUCOSE SERPL-MCNC: 121 MG/DL (ref 65–99)
GLUCOSE SERPL-MCNC: 121 MG/DL (ref 65–99)
GLUCOSE SERPL-MCNC: 122 MG/DL (ref 65–99)
GLUCOSE SERPL-MCNC: 122 MG/DL (ref 65–99)
GLUCOSE SERPL-MCNC: 146 MG/DL (ref 65–99)
GLUCOSE SERPL-MCNC: 338 MG/DL (ref 65–99)
GLUCOSE UR STRIP-MCNC: NEGATIVE MG/DL
GP B STREP DNA BAL NAA+NON-PRB-NCNCRNG: DETECTED
GRAM STN SPEC: ABNORMAL
GRAM STN SPEC: NORMAL
GRAM STN SPEC: NORMAL
HADV DNA SPEC NAA+PROBE: NOT DETECTED
HAEM INFLU DNA BAL NAA+NON-PRB-NCNCRNG: NOT DETECTED
HCO3 BLDA-SCNC: 12.7 MMOL/L (ref 22–26)
HCO3 BLDA-SCNC: 18.3 MMOL/L (ref 22–26)
HCO3 BLDA-SCNC: 18.6 MMOL/L (ref 22–26)
HCO3 BLDA-SCNC: 20.3 MMOL/L (ref 22–26)
HCO3 BLDA-SCNC: 21.2 MMOL/L (ref 22–26)
HCOV RNA LOWER RESP QL NAA+NON-PROBE: NOT DETECTED
HCT VFR BLD AUTO: 47 % (ref 37.5–51)
HCT VFR BLD AUTO: 47.5 % (ref 37.5–51)
HCT VFR BLD AUTO: 49.4 % (ref 37.5–51)
HCT VFR BLD AUTO: 51.5 % (ref 37.5–51)
HCT VFR BLD AUTO: 54.7 % (ref 37.5–51)
HGB BLD-MCNC: 14.8 G/DL (ref 13–17.7)
HGB BLD-MCNC: 15.5 G/DL (ref 13–17.7)
HGB BLD-MCNC: 16.1 G/DL (ref 13–17.7)
HGB BLD-MCNC: 16.6 G/DL (ref 13–17.7)
HGB BLD-MCNC: 18.7 G/DL (ref 13–17.7)
HGB BLDA-MCNC: 14.7 G/DL (ref 13.8–16.4)
HGB BLDA-MCNC: 17.3 G/DL (ref 13.8–16.4)
HGB BLDA-MCNC: 18.6 G/DL (ref 13.8–16.4)
HGB BLDA-MCNC: 19.4 G/DL (ref 13.8–16.4)
HGB BLDA-MCNC: 19.9 G/DL (ref 13.8–16.4)
HGB UR QL STRIP.AUTO: NEGATIVE
HMPV RNA NPH QL NAA+NON-PROBE: NOT DETECTED
HOLD SPECIMEN: NORMAL
HOLD SPECIMEN: NORMAL
HPIV RNA LOWER RESP QL NAA+NON-PROBE: NOT DETECTED
IMM GRANULOCYTES # BLD AUTO: 0.02 10*3/MM3 (ref 0–0.05)
IMM GRANULOCYTES # BLD AUTO: 0.29 10*3/MM3 (ref 0–0.05)
IMM GRANULOCYTES NFR BLD AUTO: 0.3 % (ref 0–0.5)
IMM GRANULOCYTES NFR BLD AUTO: 3 % (ref 0–0.5)
INHALED O2 CONCENTRATION: 100 %
INHALED O2 CONCENTRATION: 100 %
INHALED O2 CONCENTRATION: 40 %
IVRT: 108 MS
K AEROGENES DNA BAL NAA+NON-PRB-NCNCRNG: NOT DETECTED
K OXYTOCA DNA BAL NAA+NON-PRB-NCNCRNG: NOT DETECTED
K PNEU GRP DNA BAL NAA+NON-PRB-NCNCRNG: NOT DETECTED
KETONES UR QL STRIP: NEGATIVE
L PNEUMO DNA LOWER RESP QL NAA+NON-PROBE: NOT DETECTED
LACTATE BLDA-SCNC: 0.84 MMOL/L (ref 0.5–2)
LACTATE BLDA-SCNC: 12.41 MMOL/L (ref 0.5–2)
LACTATE BLDA-SCNC: 2.25 MMOL/L (ref 0.5–2)
LACTATE BLDA-SCNC: 2.25 MMOL/L (ref 0.5–2)
LACTATE BLDA-SCNC: ABNORMAL MMOL/L
LEUKOCYTE ESTERASE UR QL STRIP.AUTO: NEGATIVE
LYMPHOCYTES # BLD AUTO: 1 10*3/MM3 (ref 0.7–3.1)
LYMPHOCYTES # BLD AUTO: 3.11 10*3/MM3 (ref 0.7–3.1)
LYMPHOCYTES NFR BLD AUTO: 13 % (ref 19.6–45.3)
LYMPHOCYTES NFR BLD AUTO: 32.2 % (ref 19.6–45.3)
M CATARRHALIS DNA BAL NAA+NON-PRB-NCNCRNG: NOT DETECTED
M PNEUMO IGG SER IA-ACNC: NOT DETECTED
MAGNESIUM SERPL-MCNC: 1.5 MG/DL (ref 1.6–2.6)
MAGNESIUM SERPL-MCNC: 2 MG/DL (ref 1.6–2.6)
MAGNESIUM SERPL-MCNC: 2.4 MG/DL (ref 1.6–2.6)
MCH RBC QN AUTO: 31.9 PG (ref 26.6–33)
MCH RBC QN AUTO: 32 PG (ref 26.6–33)
MCH RBC QN AUTO: 32.4 PG (ref 26.6–33)
MCH RBC QN AUTO: 32.7 PG (ref 26.6–33)
MCHC RBC AUTO-ENTMCNC: 31.5 G/DL (ref 31.5–35.7)
MCHC RBC AUTO-ENTMCNC: 32.2 G/DL (ref 31.5–35.7)
MCHC RBC AUTO-ENTMCNC: 32.6 G/DL (ref 31.5–35.7)
MCHC RBC AUTO-ENTMCNC: 32.6 G/DL (ref 31.5–35.7)
MCV RBC AUTO: 100.2 FL (ref 79–97)
MCV RBC AUTO: 102.8 FL (ref 79–97)
MCV RBC AUTO: 98 FL (ref 79–97)
MCV RBC AUTO: 99.4 FL (ref 79–97)
MECA+MECC ISLT/SPM QL: ABNORMAL
METHGB BLD QL: 0.1 % (ref 0–1.5)
METHGB BLD QL: 0.3 % (ref 0–1.5)
METHGB BLD QL: 0.5 % (ref 0–1.5)
METHGB BLD QL: 0.5 % (ref 0–1.5)
METHGB BLD QL: 0.7 % (ref 0–1.5)
MODALITY: ABNORMAL
MONOCYTES # BLD AUTO: 0.51 10*3/MM3 (ref 0.1–0.9)
MONOCYTES # BLD AUTO: 0.85 10*3/MM3 (ref 0.1–0.9)
MONOCYTES NFR BLD AUTO: 6.6 % (ref 5–12)
MONOCYTES NFR BLD AUTO: 8.8 % (ref 5–12)
NDM GENE: NOT DETECTED
NEUTROPHILS NFR BLD AUTO: 5.19 10*3/MM3 (ref 1.7–7)
NEUTROPHILS NFR BLD AUTO: 5.98 10*3/MM3 (ref 1.7–7)
NEUTROPHILS NFR BLD AUTO: 53.7 % (ref 42.7–76)
NEUTROPHILS NFR BLD AUTO: 77.6 % (ref 42.7–76)
NITRITE UR QL STRIP: NEGATIVE
NOTE: ABNORMAL
NRBC BLD AUTO-RTO: 0 /100 WBC (ref 0–0.2)
NRBC BLD AUTO-RTO: 0 /100 WBC (ref 0–0.2)
NT-PROBNP SERPL-MCNC: <36 PG/ML (ref 0–450)
OSMOLALITY SERPL: 295 MOSM/KG (ref 275–300)
OSMOLALITY SERPL: 298 MOSM/KG (ref 275–300)
OSMOLALITY SERPL: 300 MOSM/KG (ref 275–300)
OSMOLALITY SERPL: 302 MOSM/KG (ref 275–300)
OSMOLALITY SERPL: 306 MOSM/KG (ref 275–300)
OSMOLALITY SERPL: 307 MOSM/KG (ref 275–300)
OSMOLALITY SERPL: 309 MOSM/KG (ref 275–300)
OSMOLALITY SERPL: 309 MOSM/KG (ref 275–300)
OSMOLALITY SERPL: 311 MOSM/KG (ref 275–300)
OSMOLALITY SERPL: 313 MOSM/KG (ref 275–300)
OSMOLALITY SERPL: 314 MOSM/KG (ref 275–300)
OSMOLALITY SERPL: 315 MOSM/KG (ref 275–300)
OSMOLALITY UR: 643 MOSM/KG
OXYHGB MFR BLDV: 68.9 % (ref 94–99)
OXYHGB MFR BLDV: 95.1 % (ref 94–99)
OXYHGB MFR BLDV: 95.6 % (ref 94–99)
OXYHGB MFR BLDV: 95.6 % (ref 94–99)
OXYHGB MFR BLDV: 97.9 % (ref 94–99)
P AERUGINOSA DNA BAL NAA+NON-PRB-NCNCRNG: NOT DETECTED
PCO2 BLDA: 124.8 MM HG (ref 35–45)
PCO2 BLDA: 30.1 MM HG (ref 35–45)
PCO2 BLDA: 32.3 MM HG (ref 35–45)
PCO2 BLDA: 39.9 MM HG (ref 35–45)
PCO2 BLDA: 45.1 MM HG (ref 35–45)
PEEP RESPIRATORY: 10 CM[H2O]
PH BLDA: 6.79 PH UNITS (ref 7.35–7.45)
PH BLDA: 7.21 PH UNITS (ref 7.35–7.45)
PH BLDA: 7.29 PH UNITS (ref 7.35–7.45)
PH BLDA: 7.32 PH UNITS (ref 7.35–7.45)
PH BLDA: 7.4 PH UNITS (ref 7.35–7.45)
PH UR STRIP.AUTO: 5.5 [PH] (ref 5–8)
PHOSPHATE SERPL-MCNC: 1.9 MG/DL (ref 2.5–4.5)
PHOSPHATE SERPL-MCNC: 3.5 MG/DL (ref 2.5–4.5)
PHOSPHATE SERPL-MCNC: 4.1 MG/DL (ref 2.5–4.5)
PLATELET # BLD AUTO: 126 10*3/MM3 (ref 140–450)
PLATELET # BLD AUTO: 143 10*3/MM3 (ref 140–450)
PLATELET # BLD AUTO: 150 10*3/MM3 (ref 140–450)
PLATELET # BLD AUTO: 152 10*3/MM3 (ref 140–450)
PMV BLD AUTO: 10.7 FL (ref 6–12)
PMV BLD AUTO: 10.9 FL (ref 6–12)
PMV BLD AUTO: 11.1 FL (ref 6–12)
PMV BLD AUTO: 11.3 FL (ref 6–12)
PO2 BLD: 213 MM[HG] (ref 0–500)
PO2 BLD: 367 MM[HG] (ref 0–500)
PO2 BLD: 488 MM[HG] (ref 0–500)
PO2 BLD: 62 MM[HG] (ref 0–500)
PO2 BLD: 96 MM[HG] (ref 0–500)
PO2 BLDA: 146.9 MM HG (ref 80–100)
PO2 BLDA: 195.3 MM HG (ref 80–100)
PO2 BLDA: 62.4 MM HG (ref 80–100)
PO2 BLDA: 85.2 MM HG (ref 80–100)
PO2 BLDA: 95.9 MM HG (ref 80–100)
POTASSIUM BLDA-SCNC: 2.69 MMOL/L (ref 3.5–5)
POTASSIUM BLDA-SCNC: 3.36 MMOL/L (ref 3.5–5)
POTASSIUM BLDA-SCNC: 3.87 MMOL/L (ref 3.5–5)
POTASSIUM BLDA-SCNC: 4.3 MMOL/L (ref 3.5–5)
POTASSIUM SERPL-SCNC: 3.3 MMOL/L (ref 3.5–5.2)
POTASSIUM SERPL-SCNC: 3.4 MMOL/L (ref 3.5–5.2)
POTASSIUM SERPL-SCNC: 3.5 MMOL/L (ref 3.5–5.2)
POTASSIUM SERPL-SCNC: 3.5 MMOL/L (ref 3.5–5.2)
POTASSIUM SERPL-SCNC: 3.7 MMOL/L (ref 3.5–5.2)
POTASSIUM SERPL-SCNC: 3.7 MMOL/L (ref 3.5–5.2)
POTASSIUM SERPL-SCNC: 3.9 MMOL/L (ref 3.5–5.2)
POTASSIUM SERPL-SCNC: 3.9 MMOL/L (ref 3.5–5.2)
POTASSIUM SERPL-SCNC: 4 MMOL/L (ref 3.5–5.2)
PROCALCITONIN SERPL-MCNC: 0.39 NG/ML (ref 0–0.25)
PROT SERPL-MCNC: 5.7 G/DL (ref 6–8.5)
PROT SERPL-MCNC: 6.2 G/DL (ref 6–8.5)
PROT SERPL-MCNC: 6.2 G/DL (ref 6–8.5)
PROT UR QL STRIP: NEGATIVE
PROTEUS SP DNA BAL NAA+NON-PRB-NCNCRNG: NOT DETECTED
QT INTERVAL: 274 MS
QT INTERVAL: 479 MS
QTC INTERVAL: 448 MS
QTC INTERVAL: 471 MS
RBC # BLD AUTO: 4.57 10*6/MM3 (ref 4.14–5.8)
RBC # BLD AUTO: 4.74 10*6/MM3 (ref 4.14–5.8)
RBC # BLD AUTO: 5.04 10*6/MM3 (ref 4.14–5.8)
RBC # BLD AUTO: 5.18 10*6/MM3 (ref 4.14–5.8)
RHINOVIRUS RNA SPEC NAA+PROBE: NOT DETECTED
RSV RNA NPH QL NAA+NON-PROBE: NOT DETECTED
S AUREUS DNA BAL NAA+NON-PRB-NCNCRNG: NOT DETECTED
S MARCESCENS DNA BAL NAA+NON-PRB-NCNCRNG: NOT DETECTED
S PNEUM DNA BAL NAA+NON-PRB-NCNCRNG: NOT DETECTED
S PYO DNA BAL NAA+NON-PRB-NCNCRNG: NOT DETECTED
SAO2 % BLDCOA: 73.5 % (ref 95–99)
SAO2 % BLDCOA: 95.9 % (ref 95–99)
SAO2 % BLDCOA: 96.1 % (ref 95–99)
SAO2 % BLDCOA: 98 % (ref 95–99)
SAO2 % BLDCOA: 98.7 % (ref 95–99)
SET MECH RESP RATE: 28
SODIUM BLDA-SCNC: 140.4 MMOL/L (ref 136–146)
SODIUM BLDA-SCNC: 141.2 MMOL/L (ref 136–146)
SODIUM BLDA-SCNC: 143.4 MMOL/L (ref 136–146)
SODIUM BLDA-SCNC: 145.8 MMOL/L (ref 136–146)
SODIUM SERPL-SCNC: 139 MMOL/L (ref 136–145)
SODIUM SERPL-SCNC: 140 MMOL/L (ref 136–145)
SODIUM SERPL-SCNC: 140 MMOL/L (ref 136–145)
SODIUM SERPL-SCNC: 141 MMOL/L (ref 136–145)
SODIUM SERPL-SCNC: 141 MMOL/L (ref 136–145)
SODIUM SERPL-SCNC: 143 MMOL/L (ref 136–145)
SODIUM SERPL-SCNC: 143 MMOL/L (ref 136–145)
SODIUM SERPL-SCNC: 144 MMOL/L (ref 136–145)
SODIUM SERPL-SCNC: 144 MMOL/L (ref 136–145)
SODIUM SERPL-SCNC: 145 MMOL/L (ref 136–145)
SODIUM SERPL-SCNC: 145 MMOL/L (ref 136–145)
SODIUM SERPL-SCNC: 146 MMOL/L (ref 136–145)
SODIUM SERPL-SCNC: 147 MMOL/L (ref 136–145)
SODIUM SERPL-SCNC: 149 MMOL/L (ref 136–145)
SODIUM SERPL-SCNC: 150 MMOL/L (ref 136–145)
SODIUM SERPL-SCNC: 151 MMOL/L (ref 136–145)
SODIUM SERPL-SCNC: 151 MMOL/L (ref 136–145)
SODIUM SERPL-SCNC: 152 MMOL/L (ref 136–145)
SODIUM SERPL-SCNC: 153 MMOL/L (ref 136–145)
SODIUM SERPL-SCNC: 153 MMOL/L (ref 136–145)
SP GR UR STRIP: <=1.005 (ref 1–1.03)
TROPONIN T DELTA: 56 NG/L
TROPONIN T SERPL HS-MCNC: 13 NG/L
TROPONIN T SERPL HS-MCNC: 66 NG/L
UROBILINOGEN UR QL STRIP: NORMAL
VENTILATOR MODE: ABNORMAL
WBC NRBC COR # BLD AUTO: 10.08 10*3/MM3 (ref 3.4–10.8)
WBC NRBC COR # BLD AUTO: 10.61 10*3/MM3 (ref 3.4–10.8)
WBC NRBC COR # BLD AUTO: 7.7 10*3/MM3 (ref 3.4–10.8)
WBC NRBC COR # BLD AUTO: 9.67 10*3/MM3 (ref 3.4–10.8)
WHOLE BLOOD HOLD COAG: NORMAL
WHOLE BLOOD HOLD SPECIMEN: NORMAL

## 2023-01-01 PROCEDURE — 84484 ASSAY OF TROPONIN QUANT: CPT | Performed by: STUDENT IN AN ORGANIZED HEALTH CARE EDUCATION/TRAINING PROGRAM

## 2023-01-01 PROCEDURE — 25810000003 SODIUM CHLORIDE 0.9 % SOLUTION: Performed by: PHYSICIAN ASSISTANT

## 2023-01-01 PROCEDURE — 25010000002 VASOPRESSIN 0.2 UNIT/ML SOLUTION: Performed by: STUDENT IN AN ORGANIZED HEALTH CARE EDUCATION/TRAINING PROGRAM

## 2023-01-01 PROCEDURE — 83050 HGB METHEMOGLOBIN QUAN: CPT | Performed by: PHYSICIAN ASSISTANT

## 2023-01-01 PROCEDURE — 25810000003 LACTATED RINGERS PER 1000 ML: Performed by: STUDENT IN AN ORGANIZED HEALTH CARE EDUCATION/TRAINING PROGRAM

## 2023-01-01 PROCEDURE — 94799 UNLISTED PULMONARY SVC/PX: CPT

## 2023-01-01 PROCEDURE — 83930 ASSAY OF BLOOD OSMOLALITY: CPT | Performed by: PHYSICIAN ASSISTANT

## 2023-01-01 PROCEDURE — 99291 CRITICAL CARE FIRST HOUR: CPT

## 2023-01-01 PROCEDURE — 36415 COLL VENOUS BLD VENIPUNCTURE: CPT

## 2023-01-01 PROCEDURE — 83605 ASSAY OF LACTIC ACID: CPT | Performed by: PHYSICIAN ASSISTANT

## 2023-01-01 PROCEDURE — 84295 ASSAY OF SERUM SODIUM: CPT | Performed by: PHYSICIAN ASSISTANT

## 2023-01-01 PROCEDURE — 71250 CT THORAX DX C-: CPT

## 2023-01-01 PROCEDURE — 93010 ELECTROCARDIOGRAM REPORT: CPT | Performed by: INTERNAL MEDICINE

## 2023-01-01 PROCEDURE — 25810000003 SODIUM CHLORIDE 3 % SOLUTION: Performed by: STUDENT IN AN ORGANIZED HEALTH CARE EDUCATION/TRAINING PROGRAM

## 2023-01-01 PROCEDURE — 82948 REAGENT STRIP/BLOOD GLUCOSE: CPT

## 2023-01-01 PROCEDURE — 81003 URINALYSIS AUTO W/O SCOPE: CPT | Performed by: EMERGENCY MEDICINE

## 2023-01-01 PROCEDURE — 25010000002 EPINEPHRINE 1 MG/10ML SOLUTION PREFILLED SYRINGE: Performed by: EMERGENCY MEDICINE

## 2023-01-01 PROCEDURE — 25810000003 SODIUM CHLORIDE 3 % SOLUTION: Performed by: PHYSICIAN ASSISTANT

## 2023-01-01 PROCEDURE — 25010000002 AMPICILLIN-SULBACTAM PER 1.5 G: Performed by: STUDENT IN AN ORGANIZED HEALTH CARE EDUCATION/TRAINING PROGRAM

## 2023-01-01 PROCEDURE — 93005 ELECTROCARDIOGRAM TRACING: CPT | Performed by: EMERGENCY MEDICINE

## 2023-01-01 PROCEDURE — 25810000003 LACTATED RINGERS SOLUTION: Performed by: STUDENT IN AN ORGANIZED HEALTH CARE EDUCATION/TRAINING PROGRAM

## 2023-01-01 PROCEDURE — 25010000002 CEFEPIME PER 500 MG: Performed by: PHYSICIAN ASSISTANT

## 2023-01-01 PROCEDURE — 87071 CULTURE AEROBIC QUANT OTHER: CPT | Performed by: STUDENT IN AN ORGANIZED HEALTH CARE EDUCATION/TRAINING PROGRAM

## 2023-01-01 PROCEDURE — 51702 INSERT TEMP BLADDER CATH: CPT

## 2023-01-01 PROCEDURE — 84484 ASSAY OF TROPONIN QUANT: CPT | Performed by: EMERGENCY MEDICINE

## 2023-01-01 PROCEDURE — 99291 CRITICAL CARE FIRST HOUR: CPT | Performed by: STUDENT IN AN ORGANIZED HEALTH CARE EDUCATION/TRAINING PROGRAM

## 2023-01-01 PROCEDURE — 93005 ELECTROCARDIOGRAM TRACING: CPT | Performed by: PHYSICIAN ASSISTANT

## 2023-01-01 PROCEDURE — 0042T HC CT CEREBRAL PERFUSION W/WO CONTRAST: CPT

## 2023-01-01 PROCEDURE — 70551 MRI BRAIN STEM W/O DYE: CPT

## 2023-01-01 PROCEDURE — 99223 1ST HOSP IP/OBS HIGH 75: CPT | Performed by: STUDENT IN AN ORGANIZED HEALTH CARE EDUCATION/TRAINING PROGRAM

## 2023-01-01 PROCEDURE — 82805 BLOOD GASES W/O2 SATURATION: CPT | Performed by: STUDENT IN AN ORGANIZED HEALTH CARE EDUCATION/TRAINING PROGRAM

## 2023-01-01 PROCEDURE — 83735 ASSAY OF MAGNESIUM: CPT | Performed by: PHYSICIAN ASSISTANT

## 2023-01-01 PROCEDURE — 76937 US GUIDE VASCULAR ACCESS: CPT | Performed by: STUDENT IN AN ORGANIZED HEALTH CARE EDUCATION/TRAINING PROGRAM

## 2023-01-01 PROCEDURE — 94002 VENT MGMT INPAT INIT DAY: CPT

## 2023-01-01 PROCEDURE — 87070 CULTURE OTHR SPECIMN AEROBIC: CPT | Performed by: EMERGENCY MEDICINE

## 2023-01-01 PROCEDURE — 25010000002 HEPARIN (PORCINE) PER 1000 UNITS: Performed by: STUDENT IN AN ORGANIZED HEALTH CARE EDUCATION/TRAINING PROGRAM

## 2023-01-01 PROCEDURE — 71045 X-RAY EXAM CHEST 1 VIEW: CPT

## 2023-01-01 PROCEDURE — 84100 ASSAY OF PHOSPHORUS: CPT | Performed by: PHYSICIAN ASSISTANT

## 2023-01-01 PROCEDURE — 25010000002 AMIODARONE IN DEXTROSE 5% 150-4.21 MG/100ML-% SOLUTION: Performed by: STUDENT IN AN ORGANIZED HEALTH CARE EDUCATION/TRAINING PROGRAM

## 2023-01-01 PROCEDURE — 82805 BLOOD GASES W/O2 SATURATION: CPT | Performed by: PHYSICIAN ASSISTANT

## 2023-01-01 PROCEDURE — 82375 ASSAY CARBOXYHB QUANT: CPT | Performed by: STUDENT IN AN ORGANIZED HEALTH CARE EDUCATION/TRAINING PROGRAM

## 2023-01-01 PROCEDURE — 83935 ASSAY OF URINE OSMOLALITY: CPT | Performed by: PHYSICIAN ASSISTANT

## 2023-01-01 PROCEDURE — 83050 HGB METHEMOGLOBIN QUAN: CPT | Performed by: EMERGENCY MEDICINE

## 2023-01-01 PROCEDURE — 80053 COMPREHEN METABOLIC PANEL: CPT | Performed by: STUDENT IN AN ORGANIZED HEALTH CARE EDUCATION/TRAINING PROGRAM

## 2023-01-01 PROCEDURE — 25010000002 AMIODARONE IN DEXTROSE 5% 360-4.14 MG/200ML-% SOLUTION: Performed by: STUDENT IN AN ORGANIZED HEALTH CARE EDUCATION/TRAINING PROGRAM

## 2023-01-01 PROCEDURE — 87205 SMEAR GRAM STAIN: CPT | Performed by: EMERGENCY MEDICINE

## 2023-01-01 PROCEDURE — 85025 COMPLETE CBC W/AUTO DIFF WBC: CPT | Performed by: STUDENT IN AN ORGANIZED HEALTH CARE EDUCATION/TRAINING PROGRAM

## 2023-01-01 PROCEDURE — 83735 ASSAY OF MAGNESIUM: CPT | Performed by: STUDENT IN AN ORGANIZED HEALTH CARE EDUCATION/TRAINING PROGRAM

## 2023-01-01 PROCEDURE — 83050 HGB METHEMOGLOBIN QUAN: CPT | Performed by: STUDENT IN AN ORGANIZED HEALTH CARE EDUCATION/TRAINING PROGRAM

## 2023-01-01 PROCEDURE — 25810000003 SODIUM CHLORIDE 0.9 % SOLUTION: Performed by: STUDENT IN AN ORGANIZED HEALTH CARE EDUCATION/TRAINING PROGRAM

## 2023-01-01 PROCEDURE — 84100 ASSAY OF PHOSPHORUS: CPT | Performed by: STUDENT IN AN ORGANIZED HEALTH CARE EDUCATION/TRAINING PROGRAM

## 2023-01-01 PROCEDURE — 78606 BRAIN IMAGE W/FLOW 4 + VIEWS: CPT

## 2023-01-01 PROCEDURE — 82375 ASSAY CARBOXYHB QUANT: CPT | Performed by: EMERGENCY MEDICINE

## 2023-01-01 PROCEDURE — 0B9F8ZX DRAINAGE OF RIGHT LOWER LUNG LOBE, VIA NATURAL OR ARTIFICIAL OPENING ENDOSCOPIC, DIAGNOSTIC: ICD-10-PCS | Performed by: STUDENT IN AN ORGANIZED HEALTH CARE EDUCATION/TRAINING PROGRAM

## 2023-01-01 PROCEDURE — 70450 CT HEAD/BRAIN W/O DYE: CPT

## 2023-01-01 PROCEDURE — 31624 DX BRONCHOSCOPE/LAVAGE: CPT | Performed by: STUDENT IN AN ORGANIZED HEALTH CARE EDUCATION/TRAINING PROGRAM

## 2023-01-01 PROCEDURE — 25010000002 MAGNESIUM SULFATE 4 GM/100ML SOLUTION: Performed by: PHYSICIAN ASSISTANT

## 2023-01-01 PROCEDURE — 87147 CULTURE TYPE IMMUNOLOGIC: CPT | Performed by: STUDENT IN AN ORGANIZED HEALTH CARE EDUCATION/TRAINING PROGRAM

## 2023-01-01 PROCEDURE — 94003 VENT MGMT INPAT SUBQ DAY: CPT

## 2023-01-01 PROCEDURE — 87116 MYCOBACTERIA CULTURE: CPT | Performed by: STUDENT IN AN ORGANIZED HEALTH CARE EDUCATION/TRAINING PROGRAM

## 2023-01-01 PROCEDURE — 82375 ASSAY CARBOXYHB QUANT: CPT | Performed by: PHYSICIAN ASSISTANT

## 2023-01-01 PROCEDURE — 25810000003 SODIUM CHLORIDE 0.9 % SOLUTION: Performed by: EMERGENCY MEDICINE

## 2023-01-01 PROCEDURE — 99231 SBSQ HOSP IP/OBS SF/LOW 25: CPT | Performed by: PSYCHIATRY & NEUROLOGY

## 2023-01-01 PROCEDURE — 99291 CRITICAL CARE FIRST HOUR: CPT | Performed by: INTERNAL MEDICINE

## 2023-01-01 PROCEDURE — 85027 COMPLETE CBC AUTOMATED: CPT | Performed by: PHYSICIAN ASSISTANT

## 2023-01-01 PROCEDURE — 74176 CT ABD & PELVIS W/O CONTRAST: CPT

## 2023-01-01 PROCEDURE — 85025 COMPLETE CBC W/AUTO DIFF WBC: CPT | Performed by: EMERGENCY MEDICINE

## 2023-01-01 PROCEDURE — 84145 PROCALCITONIN (PCT): CPT | Performed by: PHYSICIAN ASSISTANT

## 2023-01-01 PROCEDURE — 31645 BRNCHSC W/THER ASPIR 1ST: CPT | Performed by: STUDENT IN AN ORGANIZED HEALTH CARE EDUCATION/TRAINING PROGRAM

## 2023-01-01 PROCEDURE — 36620 INSERTION CATHETER ARTERY: CPT | Performed by: STUDENT IN AN ORGANIZED HEALTH CARE EDUCATION/TRAINING PROGRAM

## 2023-01-01 PROCEDURE — 80048 BASIC METABOLIC PNL TOTAL CA: CPT | Performed by: PHYSICIAN ASSISTANT

## 2023-01-01 PROCEDURE — 94761 N-INVAS EAR/PLS OXIMETRY MLT: CPT

## 2023-01-01 PROCEDURE — 4A133BC MONITORING OF ARTERIAL PRESSURE, CORONARY, PERCUTANEOUS APPROACH: ICD-10-PCS | Performed by: STUDENT IN AN ORGANIZED HEALTH CARE EDUCATION/TRAINING PROGRAM

## 2023-01-01 PROCEDURE — 93306 TTE W/DOPPLER COMPLETE: CPT

## 2023-01-01 PROCEDURE — 25010000002 LORAZEPAM PER 2 MG: Performed by: STUDENT IN AN ORGANIZED HEALTH CARE EDUCATION/TRAINING PROGRAM

## 2023-01-01 PROCEDURE — 99222 1ST HOSP IP/OBS MODERATE 55: CPT | Performed by: PSYCHIATRY & NEUROLOGY

## 2023-01-01 PROCEDURE — 0 TECHNETIUM EXAMETAZIME KIT: Performed by: INTERNAL MEDICINE

## 2023-01-01 PROCEDURE — 87206 SMEAR FLUORESCENT/ACID STAI: CPT | Performed by: STUDENT IN AN ORGANIZED HEALTH CARE EDUCATION/TRAINING PROGRAM

## 2023-01-01 PROCEDURE — 83605 ASSAY OF LACTIC ACID: CPT | Performed by: EMERGENCY MEDICINE

## 2023-01-01 PROCEDURE — 82805 BLOOD GASES W/O2 SATURATION: CPT | Performed by: EMERGENCY MEDICINE

## 2023-01-01 PROCEDURE — 87633 RESP VIRUS 12-25 TARGETS: CPT | Performed by: STUDENT IN AN ORGANIZED HEALTH CARE EDUCATION/TRAINING PROGRAM

## 2023-01-01 PROCEDURE — 87102 FUNGUS ISOLATION CULTURE: CPT | Performed by: STUDENT IN AN ORGANIZED HEALTH CARE EDUCATION/TRAINING PROGRAM

## 2023-01-01 PROCEDURE — 80076 HEPATIC FUNCTION PANEL: CPT | Performed by: PHYSICIAN ASSISTANT

## 2023-01-01 PROCEDURE — 93306 TTE W/DOPPLER COMPLETE: CPT | Performed by: INTERNAL MEDICINE

## 2023-01-01 PROCEDURE — 87205 SMEAR GRAM STAIN: CPT | Performed by: STUDENT IN AN ORGANIZED HEALTH CARE EDUCATION/TRAINING PROGRAM

## 2023-01-01 PROCEDURE — 25010000002 MORPHINE PER 10 MG: Performed by: STUDENT IN AN ORGANIZED HEALTH CARE EDUCATION/TRAINING PROGRAM

## 2023-01-01 PROCEDURE — 99285 EMERGENCY DEPT VISIT HI MDM: CPT

## 2023-01-01 PROCEDURE — 83880 ASSAY OF NATRIURETIC PEPTIDE: CPT | Performed by: EMERGENCY MEDICINE

## 2023-01-01 PROCEDURE — 36600 WITHDRAWAL OF ARTERIAL BLOOD: CPT | Performed by: EMERGENCY MEDICINE

## 2023-01-01 PROCEDURE — 85018 HEMOGLOBIN: CPT | Performed by: PHYSICIAN ASSISTANT

## 2023-01-01 PROCEDURE — 80053 COMPREHEN METABOLIC PANEL: CPT | Performed by: EMERGENCY MEDICINE

## 2023-01-01 PROCEDURE — 5A1945Z RESPIRATORY VENTILATION, 24-96 CONSECUTIVE HOURS: ICD-10-PCS | Performed by: EMERGENCY MEDICINE

## 2023-01-01 PROCEDURE — 85014 HEMATOCRIT: CPT | Performed by: PHYSICIAN ASSISTANT

## 2023-01-01 PROCEDURE — 36556 INSERT NON-TUNNEL CV CATH: CPT | Performed by: STUDENT IN AN ORGANIZED HEALTH CARE EDUCATION/TRAINING PROGRAM

## 2023-01-01 PROCEDURE — 0BH18EZ INSERTION OF ENDOTRACHEAL AIRWAY INTO TRACHEA, VIA NATURAL OR ARTIFICIAL OPENING ENDOSCOPIC: ICD-10-PCS | Performed by: EMERGENCY MEDICINE

## 2023-01-01 PROCEDURE — 25510000001 IOPAMIDOL PER 1 ML: Performed by: INTERNAL MEDICINE

## 2023-01-01 PROCEDURE — 31500 INSERT EMERGENCY AIRWAY: CPT

## 2023-01-01 PROCEDURE — A9521 TC99M EXAMETAZIME: HCPCS | Performed by: INTERNAL MEDICINE

## 2023-01-01 RX ORDER — MIRTAZAPINE 15 MG/1
15 TABLET, FILM COATED ORAL NIGHTLY
COMMUNITY
Start: 2023-01-01

## 2023-01-01 RX ORDER — PALIPERIDONE PALMITATE 234 MG/1.5ML
234 INJECTION INTRAMUSCULAR
COMMUNITY
Start: 2023-01-01

## 2023-01-01 RX ORDER — ACETAMINOPHEN 650 MG/1
650 SUPPOSITORY RECTAL EVERY 4 HOURS PRN
Status: DISCONTINUED | OUTPATIENT
Start: 2023-01-01 | End: 2023-01-01 | Stop reason: HOSPADM

## 2023-01-01 RX ORDER — SODIUM CHLORIDE 9 MG/ML
40 INJECTION, SOLUTION INTRAVENOUS AS NEEDED
Status: DISCONTINUED | OUTPATIENT
Start: 2023-01-01 | End: 2023-01-01 | Stop reason: HOSPADM

## 2023-01-01 RX ORDER — NITROGLYCERIN 0.4 MG/1
0.4 TABLET SUBLINGUAL
Status: DISCONTINUED | OUTPATIENT
Start: 2023-01-01 | End: 2023-01-01 | Stop reason: HOSPADM

## 2023-01-01 RX ORDER — HYDROXYZINE HYDROCHLORIDE 25 MG/1
25 TABLET, FILM COATED ORAL EVERY 4 HOURS PRN
COMMUNITY
Start: 2023-01-01

## 2023-01-01 RX ORDER — 3% SODIUM CHLORIDE 3 G/100ML
30 INJECTION, SOLUTION INTRAVENOUS CONTINUOUS
Status: DISPENSED | OUTPATIENT
Start: 2023-01-01 | End: 2023-01-01

## 2023-01-01 RX ORDER — FENTANYL/ROPIVACAINE/NS/PF 2-625MCG/1
15 PLASTIC BAG, INJECTION (ML) EPIDURAL
Status: COMPLETED | OUTPATIENT
Start: 2023-01-01 | End: 2023-01-01

## 2023-01-01 RX ORDER — PANTOPRAZOLE SODIUM 40 MG/10ML
80 INJECTION, POWDER, LYOPHILIZED, FOR SOLUTION INTRAVENOUS ONCE
Status: COMPLETED | OUTPATIENT
Start: 2023-01-01 | End: 2023-01-01

## 2023-01-01 RX ORDER — CALCIUM CHLORIDE 100 MG/ML
1 INJECTION INTRAVENOUS; INTRAVENTRICULAR ONCE
Status: COMPLETED | OUTPATIENT
Start: 2023-01-01 | End: 2023-01-01

## 2023-01-01 RX ORDER — FENTANYL CITRATE-0.9 % NACL/PF 10 MCG/ML
50-300 PLASTIC BAG, INJECTION (ML) INTRAVENOUS
Status: DISCONTINUED | OUTPATIENT
Start: 2023-01-01 | End: 2023-01-01

## 2023-01-01 RX ORDER — SODIUM CHLORIDE 0.9 % (FLUSH) 0.9 %
10 SYRINGE (ML) INJECTION AS NEEDED
Status: DISCONTINUED | OUTPATIENT
Start: 2023-01-01 | End: 2023-01-01 | Stop reason: HOSPADM

## 2023-01-01 RX ORDER — DEXTROSE MONOHYDRATE 25 G/50ML
25 INJECTION, SOLUTION INTRAVENOUS
Status: DISCONTINUED | OUTPATIENT
Start: 2023-01-01 | End: 2023-01-01 | Stop reason: HOSPADM

## 2023-01-01 RX ORDER — NOREPINEPHRINE BITARTRATE 0.03 MG/ML
INJECTION, SOLUTION INTRAVENOUS
Status: COMPLETED
Start: 2023-01-01 | End: 2023-01-01

## 2023-01-01 RX ORDER — 3% SODIUM CHLORIDE 3 G/100ML
10 INJECTION, SOLUTION INTRAVENOUS CONTINUOUS
Status: DISCONTINUED | OUTPATIENT
Start: 2023-01-01 | End: 2023-01-01

## 2023-01-01 RX ORDER — 3% SODIUM CHLORIDE 3 G/100ML
10 INJECTION, SOLUTION INTRAVENOUS CONTINUOUS
Status: ACTIVE | OUTPATIENT
Start: 2023-01-01 | End: 2023-01-01

## 2023-01-01 RX ORDER — NOREPINEPHRINE BITARTRATE 0.03 MG/ML
.02-.3 INJECTION, SOLUTION INTRAVENOUS
Status: DISCONTINUED | OUTPATIENT
Start: 2023-01-01 | End: 2023-01-01 | Stop reason: HOSPADM

## 2023-01-01 RX ORDER — DIPHENOXYLATE HYDROCHLORIDE AND ATROPINE SULFATE 2.5; .025 MG/1; MG/1
1 TABLET ORAL
Status: DISCONTINUED | OUTPATIENT
Start: 2023-01-01 | End: 2023-01-01 | Stop reason: HOSPADM

## 2023-01-01 RX ORDER — CHLORHEXIDINE GLUCONATE 0.12 MG/ML
15 RINSE ORAL EVERY 12 HOURS SCHEDULED
Status: DISCONTINUED | OUTPATIENT
Start: 2023-01-01 | End: 2023-01-01 | Stop reason: HOSPADM

## 2023-01-01 RX ORDER — LORAZEPAM 2 MG/ML
0.5 INJECTION INTRAMUSCULAR
Status: DISCONTINUED | OUTPATIENT
Start: 2023-01-01 | End: 2023-01-01 | Stop reason: HOSPADM

## 2023-01-01 RX ORDER — ACETAMINOPHEN 325 MG/1
650 TABLET ORAL EVERY 4 HOURS PRN
Status: DISCONTINUED | OUTPATIENT
Start: 2023-01-01 | End: 2023-01-01 | Stop reason: HOSPADM

## 2023-01-01 RX ORDER — MORPHINE SULFATE 2 MG/ML
2 INJECTION, SOLUTION INTRAMUSCULAR; INTRAVENOUS EVERY 4 HOURS PRN
Status: DISCONTINUED | OUTPATIENT
Start: 2023-01-01 | End: 2023-01-01 | Stop reason: HOSPADM

## 2023-01-01 RX ORDER — LORAZEPAM 2 MG/ML
1 INJECTION INTRAMUSCULAR
Status: DISCONTINUED | OUTPATIENT
Start: 2023-01-01 | End: 2023-01-01 | Stop reason: HOSPADM

## 2023-01-01 RX ORDER — MANNITOL 20 G/100ML
100 INJECTION, SOLUTION INTRAVENOUS EVERY 8 HOURS
Status: DISCONTINUED | OUTPATIENT
Start: 2023-01-01 | End: 2023-01-01 | Stop reason: HOSPADM

## 2023-01-01 RX ORDER — IBUPROFEN 600 MG/1
1 TABLET ORAL
Status: DISCONTINUED | OUTPATIENT
Start: 2023-01-01 | End: 2023-01-01 | Stop reason: HOSPADM

## 2023-01-01 RX ORDER — BENZTROPINE MESYLATE 0.5 MG/1
0.5 TABLET ORAL 2 TIMES DAILY
COMMUNITY
Start: 2023-01-01

## 2023-01-01 RX ORDER — FENTANYL CITRATE-0.9 % NACL/PF 10 MCG/ML
PLASTIC BAG, INJECTION (ML) INTRAVENOUS
Status: COMPLETED
Start: 2023-01-01 | End: 2023-01-01

## 2023-01-01 RX ORDER — MAGNESIUM SULFATE HEPTAHYDRATE 40 MG/ML
4 INJECTION, SOLUTION INTRAVENOUS ONCE
Status: COMPLETED | OUTPATIENT
Start: 2023-01-01 | End: 2023-01-01

## 2023-01-01 RX ORDER — SODIUM CHLORIDE, SODIUM LACTATE, POTASSIUM CHLORIDE, CALCIUM CHLORIDE 600; 310; 30; 20 MG/100ML; MG/100ML; MG/100ML; MG/100ML
75 INJECTION, SOLUTION INTRAVENOUS CONTINUOUS
Status: DISCONTINUED | OUTPATIENT
Start: 2023-01-01 | End: 2023-01-01 | Stop reason: HOSPADM

## 2023-01-01 RX ORDER — NICOTINE POLACRILEX 4 MG
15 LOZENGE BUCCAL
Status: DISCONTINUED | OUTPATIENT
Start: 2023-01-01 | End: 2023-01-01 | Stop reason: HOSPADM

## 2023-01-01 RX ORDER — LORAZEPAM 2 MG/ML
2 INJECTION INTRAMUSCULAR
Status: DISCONTINUED | OUTPATIENT
Start: 2023-01-01 | End: 2023-01-01 | Stop reason: HOSPADM

## 2023-01-01 RX ORDER — HEPARIN SODIUM 5000 [USP'U]/ML
5000 INJECTION, SOLUTION INTRAVENOUS; SUBCUTANEOUS EVERY 8 HOURS SCHEDULED
Status: DISCONTINUED | OUTPATIENT
Start: 2023-01-01 | End: 2023-01-01 | Stop reason: HOSPADM

## 2023-01-01 RX ORDER — ACETAMINOPHEN 160 MG/5ML
650 SOLUTION ORAL EVERY 4 HOURS PRN
Status: DISCONTINUED | OUTPATIENT
Start: 2023-01-01 | End: 2023-01-01 | Stop reason: HOSPADM

## 2023-01-01 RX ORDER — SODIUM CHLORIDE 0.9 % (FLUSH) 0.9 %
10 SYRINGE (ML) INJECTION EVERY 12 HOURS SCHEDULED
Status: DISCONTINUED | OUTPATIENT
Start: 2023-01-01 | End: 2023-01-01 | Stop reason: HOSPADM

## 2023-01-01 RX ORDER — ALPRAZOLAM 1 MG/1
1 TABLET ORAL EVERY 12 HOURS SCHEDULED
COMMUNITY
Start: 2023-01-01

## 2023-01-01 RX ADMIN — VASOPRESSIN 0.04 UNITS/MIN: 0.2 INJECTION INTRAVENOUS at 14:12

## 2023-01-01 RX ADMIN — AMPICILLIN AND SULBACTAM 3 G: 2; 1 INJECTION, POWDER, FOR SOLUTION INTRAVENOUS at 04:40

## 2023-01-01 RX ADMIN — CHLORHEXIDINE GLUCONATE 15 ML: 1.2 RINSE ORAL at 10:23

## 2023-01-01 RX ADMIN — VASOPRESSIN 0.04 UNITS/MIN: 0.2 INJECTION INTRAVENOUS at 05:41

## 2023-01-01 RX ADMIN — CHLORHEXIDINE GLUCONATE 15 ML: 1.2 RINSE ORAL at 20:14

## 2023-01-01 RX ADMIN — SODIUM CHLORIDE, POTASSIUM CHLORIDE, SODIUM LACTATE AND CALCIUM CHLORIDE 1000 ML: 600; 310; 30; 20 INJECTION, SOLUTION INTRAVENOUS at 15:50

## 2023-01-01 RX ADMIN — Medication 10 ML: at 08:08

## 2023-01-01 RX ADMIN — MANNITOL 100 G: 20 INJECTION, SOLUTION INTRAVENOUS at 12:02

## 2023-01-01 RX ADMIN — NOREPINEPHRINE BITARTRATE 0.14 MCG/KG/MIN: 1 INJECTION, SOLUTION, CONCENTRATE INTRAVENOUS at 02:01

## 2023-01-01 RX ADMIN — NOREPINEPHRINE BITARTRATE 0.08 MCG/KG/MIN: 1 INJECTION, SOLUTION, CONCENTRATE INTRAVENOUS at 17:31

## 2023-01-01 RX ADMIN — CHLORHEXIDINE GLUCONATE 15 ML: 1.2 RINSE ORAL at 20:18

## 2023-01-01 RX ADMIN — NOREPINEPHRINE BITARTRATE 0.12 MCG/KG/MIN: 1 INJECTION, SOLUTION, CONCENTRATE INTRAVENOUS at 04:24

## 2023-01-01 RX ADMIN — POTASSIUM PHOSPHATE, MONOBASIC POTASSIUM PHOSPHATE, DIBASIC 15 MMOL: 224; 236 INJECTION, SOLUTION, CONCENTRATE INTRAVENOUS at 10:23

## 2023-01-01 RX ADMIN — Medication 10 ML: at 20:14

## 2023-01-01 RX ADMIN — SODIUM CHLORIDE, POTASSIUM CHLORIDE, SODIUM LACTATE AND CALCIUM CHLORIDE 75 ML/HR: 600; 310; 30; 20 INJECTION, SOLUTION INTRAVENOUS at 15:50

## 2023-01-01 RX ADMIN — MAGNESIUM SULFATE HEPTAHYDRATE 4 G: 40 INJECTION, SOLUTION INTRAVENOUS at 11:56

## 2023-01-01 RX ADMIN — AMIODARONE HYDROCHLORIDE 1 MG/MIN: 1.8 INJECTION, SOLUTION INTRAVENOUS at 06:46

## 2023-01-01 RX ADMIN — CEFEPIME 2000 MG: 2 INJECTION, POWDER, FOR SOLUTION INTRAVENOUS at 06:16

## 2023-01-01 RX ADMIN — VASOPRESSIN 0.04 UNITS/MIN: 0.2 INJECTION INTRAVENOUS at 03:36

## 2023-01-01 RX ADMIN — CEFEPIME 2000 MG: 2 INJECTION, POWDER, FOR SOLUTION INTRAVENOUS at 15:42

## 2023-01-01 RX ADMIN — MANNITOL 100 G: 20 INJECTION, SOLUTION INTRAVENOUS at 03:24

## 2023-01-01 RX ADMIN — PANTOPRAZOLE SODIUM 80 MG: 40 INJECTION, POWDER, FOR SOLUTION INTRAVENOUS at 06:43

## 2023-01-01 RX ADMIN — HEPARIN SODIUM 5000 UNITS: 5000 INJECTION INTRAVENOUS; SUBCUTANEOUS at 05:16

## 2023-01-01 RX ADMIN — Medication 10 ML: at 08:38

## 2023-01-01 RX ADMIN — HEPARIN SODIUM 5000 UNITS: 5000 INJECTION INTRAVENOUS; SUBCUTANEOUS at 22:13

## 2023-01-01 RX ADMIN — SODIUM CHLORIDE 30 ML/HR: 3 INJECTION, SOLUTION INTRAVENOUS at 19:58

## 2023-01-01 RX ADMIN — NOREPINEPHRINE BITARTRATE 0.1 MCG/KG/MIN: 1 INJECTION, SOLUTION, CONCENTRATE INTRAVENOUS at 20:13

## 2023-01-01 RX ADMIN — HEPARIN SODIUM 5000 UNITS: 5000 INJECTION INTRAVENOUS; SUBCUTANEOUS at 15:00

## 2023-01-01 RX ADMIN — AMPICILLIN AND SULBACTAM 3 G: 2; 1 INJECTION, POWDER, FOR SOLUTION INTRAVENOUS at 16:44

## 2023-01-01 RX ADMIN — EPINEPHRINE 1 MG: 0.1 INJECTION INTRAVENOUS at 04:59

## 2023-01-01 RX ADMIN — POTASSIUM PHOSPHATE, MONOBASIC POTASSIUM PHOSPHATE, DIBASIC 15 MMOL: 224; 236 INJECTION, SOLUTION, CONCENTRATE INTRAVENOUS at 16:44

## 2023-01-01 RX ADMIN — HEPARIN SODIUM 5000 UNITS: 5000 INJECTION INTRAVENOUS; SUBCUTANEOUS at 16:04

## 2023-01-01 RX ADMIN — AMIODARONE HYDROCHLORIDE 1 MG/MIN: 1.8 INJECTION, SOLUTION INTRAVENOUS at 12:43

## 2023-01-01 RX ADMIN — POTASSIUM PHOSPHATE, MONOBASIC POTASSIUM PHOSPHATE, DIBASIC 15 MMOL: 224; 236 INJECTION, SOLUTION, CONCENTRATE INTRAVENOUS at 13:27

## 2023-01-01 RX ADMIN — LORAZEPAM 2 MG: 2 INJECTION INTRAMUSCULAR; INTRAVENOUS at 15:51

## 2023-01-01 RX ADMIN — NOREPINEPHRINE BITARTRATE 0.1 MCG/KG/MIN: 1 INJECTION, SOLUTION, CONCENTRATE INTRAVENOUS at 05:41

## 2023-01-01 RX ADMIN — IOPAMIDOL 80 ML: 755 INJECTION, SOLUTION INTRAVENOUS at 16:15

## 2023-01-01 RX ADMIN — MANNITOL 100 G: 20 INJECTION, SOLUTION INTRAVENOUS at 20:20

## 2023-01-01 RX ADMIN — CHLORHEXIDINE GLUCONATE 15 ML: 1.2 RINSE ORAL at 12:06

## 2023-01-01 RX ADMIN — CHLORHEXIDINE GLUCONATE 15 ML: 1.2 RINSE ORAL at 09:59

## 2023-01-01 RX ADMIN — SODIUM BICARBONATE 50 MEQ: 84 INJECTION INTRAVENOUS at 05:48

## 2023-01-01 RX ADMIN — Medication 10 ML: at 20:18

## 2023-01-01 RX ADMIN — NOREPINEPHRINE BITARTRATE 0.05 MCG/KG/MIN: 1 INJECTION, SOLUTION, CONCENTRATE INTRAVENOUS at 05:36

## 2023-01-01 RX ADMIN — Medication 10 ML: at 10:24

## 2023-01-01 RX ADMIN — AMPICILLIN AND SULBACTAM 3 G: 2; 1 INJECTION, POWDER, FOR SOLUTION INTRAVENOUS at 11:30

## 2023-01-01 RX ADMIN — VASOPRESSIN 0.04 UNITS/MIN: 0.2 INJECTION INTRAVENOUS at 20:02

## 2023-01-01 RX ADMIN — SODIUM CHLORIDE 30 ML/HR: 3 INJECTION, SOLUTION INTRAVENOUS at 12:29

## 2023-01-01 RX ADMIN — Medication 50 MCG/HR: at 05:37

## 2023-01-01 RX ADMIN — CEFEPIME 2000 MG: 2 INJECTION, POWDER, FOR SOLUTION INTRAVENOUS at 22:24

## 2023-01-01 RX ADMIN — VASOPRESSIN 0.04 UNITS/MIN: 0.2 INJECTION INTRAVENOUS at 11:44

## 2023-01-01 RX ADMIN — SODIUM CHLORIDE 30 ML/HR: 3 INJECTION, SOLUTION INTRAVENOUS at 03:45

## 2023-01-01 RX ADMIN — CHLORHEXIDINE GLUCONATE 15 ML: 1.2 RINSE ORAL at 08:08

## 2023-01-01 RX ADMIN — SODIUM CHLORIDE, POTASSIUM CHLORIDE, SODIUM LACTATE AND CALCIUM CHLORIDE 75 ML/HR: 600; 310; 30; 20 INJECTION, SOLUTION INTRAVENOUS at 03:52

## 2023-01-01 RX ADMIN — CEFEPIME 2000 MG: 2 INJECTION, POWDER, FOR SOLUTION INTRAVENOUS at 17:28

## 2023-01-01 RX ADMIN — CALCIUM CHLORIDE 1 G: 100 INJECTION INTRAVENOUS; INTRAVENTRICULAR at 05:51

## 2023-01-01 RX ADMIN — Medication 10 ML: at 20:57

## 2023-01-01 RX ADMIN — AMPICILLIN AND SULBACTAM 3 G: 2; 1 INJECTION, POWDER, FOR SOLUTION INTRAVENOUS at 13:52

## 2023-01-01 RX ADMIN — HEPARIN SODIUM 5000 UNITS: 5000 INJECTION INTRAVENOUS; SUBCUTANEOUS at 14:12

## 2023-01-01 RX ADMIN — AMIODARONE HYDROCHLORIDE 150 MG: 1.5 INJECTION, SOLUTION INTRAVENOUS at 06:30

## 2023-01-01 RX ADMIN — CEFEPIME 2000 MG: 2 INJECTION, POWDER, FOR SOLUTION INTRAVENOUS at 22:41

## 2023-01-01 RX ADMIN — VASOPRESSIN 0.04 UNITS/MIN: 0.2 INJECTION INTRAVENOUS at 21:42

## 2023-01-01 RX ADMIN — HEPARIN SODIUM 5000 UNITS: 5000 INJECTION INTRAVENOUS; SUBCUTANEOUS at 21:02

## 2023-01-01 RX ADMIN — NOREPINEPHRINE BITARTRATE 0.14 MCG/KG/MIN: 1 INJECTION, SOLUTION, CONCENTRATE INTRAVENOUS at 17:19

## 2023-01-01 RX ADMIN — VASOPRESSIN 0.04 UNITS/MIN: 0.2 INJECTION INTRAVENOUS at 21:55

## 2023-01-01 RX ADMIN — CHLORHEXIDINE GLUCONATE 15 ML: 1.2 RINSE ORAL at 20:57

## 2023-01-01 RX ADMIN — SODIUM CHLORIDE, POTASSIUM CHLORIDE, SODIUM LACTATE AND CALCIUM CHLORIDE 75 ML/HR: 600; 310; 30; 20 INJECTION, SOLUTION INTRAVENOUS at 04:24

## 2023-01-01 RX ADMIN — Medication 10 ML: at 09:59

## 2023-01-01 RX ADMIN — VASOPRESSIN 0.04 UNITS/MIN: 0.2 INJECTION INTRAVENOUS at 14:36

## 2023-01-01 RX ADMIN — AMPICILLIN AND SULBACTAM 3 G: 2; 1 INJECTION, POWDER, FOR SOLUTION INTRAVENOUS at 23:13

## 2023-01-01 RX ADMIN — Medication 0.05 MCG/KG/MIN: at 05:36

## 2023-01-01 RX ADMIN — MANNITOL 100 G: 20 INJECTION, SOLUTION INTRAVENOUS at 20:07

## 2023-01-01 RX ADMIN — VASOPRESSIN 0.04 UNITS/MIN: 0.2 INJECTION INTRAVENOUS at 07:40

## 2023-01-01 RX ADMIN — SODIUM CHLORIDE 1000 ML: 9 INJECTION, SOLUTION INTRAVENOUS at 05:30

## 2023-01-01 RX ADMIN — TECHNETIUM TC-99M EXAMETAZIME 1 DOSE: 0.5 INJECTION, POWDER, LYOPHILIZED, FOR SOLUTION INTRAVENOUS at 09:20

## 2023-01-01 RX ADMIN — HEPARIN SODIUM 5000 UNITS: 5000 INJECTION INTRAVENOUS; SUBCUTANEOUS at 06:16

## 2023-01-01 RX ADMIN — HEPARIN SODIUM 5000 UNITS: 5000 INJECTION INTRAVENOUS; SUBCUTANEOUS at 05:00

## 2023-01-01 RX ADMIN — MORPHINE SULFATE 2 MG: 2 INJECTION, SOLUTION INTRAMUSCULAR; INTRAVENOUS at 15:51

## 2023-01-01 RX ADMIN — MANNITOL 100 G: 20 INJECTION, SOLUTION INTRAVENOUS at 03:47

## 2023-01-01 RX ADMIN — VASOPRESSIN 0.04 UNITS/MIN: 0.2 INJECTION INTRAVENOUS at 04:23

## 2023-01-01 RX ADMIN — MANNITOL 100 G: 20 INJECTION, SOLUTION INTRAVENOUS at 12:51

## 2023-12-14 PROBLEM — I46.9 CARDIAC ARREST: Status: ACTIVE | Noted: 2023-01-01

## 2023-12-14 NOTE — PAYOR COMM NOTE
[facility] DEPARTMENT    Erna Robertson RN  Phone [phone]  Fax [phone]    [address] [place] [address]    NPI [id number]  TAX ID [id number]    PHYSICIAN NAME AND NPI   TRISHA TAYLOR  [id number]    BED TYPE  INPATIENT CRITICAL CARE    TYPE OF ADMISSION: ED ADMISSION MEDICAL    ICD 10 CODE  I46.9, I48.91, T17.320A    DATE OF ADMISSION 12/14/2023      Leonard Teixeira (33 y.o. Male)       Date of Birth   1990    Social Security Number       Address   [address] [place] [address] [address] [facility] [address]    Home Phone   [phone]    MRN   [id number]       Worship   None    Marital Status   Single                            Admission Date   12/14/23    Admission Type   Emergency    Admitting Provider   Trisha Taylor MD    Attending Provider   Trisha Taylor MD    Department, Room/Bed   [facility] CORONARY CARE UNIT, C06/1       Discharge Date       Discharge Disposition       Discharge Destination                                 Attending Provider: Trisha Taylor MD    Allergies: No Known Allergies    Isolation: None   Infection: None   Code Status: CPR    Ht: --   Wt: 117 kg (257 lb 11.5 oz)    Admission Cmt: None   Principal Problem: Cardiac arrest [I46.9]                   Active Insurance as of 12/14/2023       Primary Coverage       Payor Plan Insurance Group Employer/Plan Group    [facility] BY ADENIKE [id number]       Payor Plan Address Payor Plan Phone Number Payor Plan Fax Number Effective Dates    [address] [address]   1/1/2021 - None Entered    [facility] [id number]         Subscriber Name Subscriber Birth Date Member ID       LEONARD TEIXEIRA 1990 [id number]                     Emergency Contacts        (Rel.) Home Phone Work Phone Mobile Phone    СЕРГЕЙ LOYD (Mother) [phone] -- [phone]             Ventricular Arrhythmias RRG Inpatient Care       Indications  Met   Last updated by Erna Robertson on 12/14/2023 0900     Review Status Created By   Primary Completed Erna Robertson      Criteria Review   Ventricular Arrhythmias RRG Inpatient Care     Overall Determination: Indications Met     Criteria:  [×] Admission is indicated for  1 or more  of the following :      [×] Inadequate perfusion due to ventricular arrhythmia, as indicated by  1 or more  of the following :          [×] Lactate of 2.0 mmol/L (18 mg/dL) or more [A]              12/14/2023  9:00 AM                  -- 12/14/2023  9:00 AM by Erna Robertson --                      Lactate 12.1          [×] Altered mental status              12/14/2023  9:00 AM                  -- 12/14/2023  9:00 AM by Erna Robertson --                                            (X) Altered mental status (ie, different from baseline), as indicated by  1 or more  of the following  (1) (2) (3) (4):                      (X) Coma (not arousable)              12/14/2023  9:00 AM                  -- 12/14/2023  9:00 AM by Erna Robertson --                      Intubated, obtunded, Pupils fixed and dilated, not responsive to light          [×] IV vasopressor medication required to maintain adequate blood pressure              12/14/2023  9:00 AM                  -- 12/14/2023  9:00 AM by Erna Robertson --                      Requiring Levophed IV infusion     Notes:  -- 12/14/2023  9:00 AM by Erna Robertson --      33 y.o. male presented to the ED for evaluation of cardiac arrest. Patient's mother reports that the patient typically has difficulty sleeping at night and reportedly eats meals late at night. He woke the mother pointing in his neck and chest pantomiming as though he was choking. She attempted to perform the Heimlich maneuver but due to his large size as he collapsed to the floor he ultimately pulled her down with him. She attempted to sweep his mouth and perform CPR while waiting for EMS arrival. When EMS arrived they attempted to continue to clear the  patient's oropharynx and attempted intubation multiple times without success. They report the patient was pulseless on their arrival and in a PEA rhythm.Patient was reported to be pulseless for approximately 30 to 45 minutes prior to arrival in the emergency department. He received 3 doses of epinephrine and 1 dose of sodium bicarb in route. EMS reports they never found a rhythm other than pulseless electrical activity. ROSC was achieved as well as the patient arrived to the ED.       In the ED, temperature 93.7, started on Olegario hugger, patient noted to be in A-fib with RVR with a heart rate in 160s to 170s, intubated in the ED and is currently on volume AC. Patient is hypotensive requiring Levophed, currently at maximum dose. ABG 6.7 9/124/62 on 100% FiO2, normal troponin, normal proBNP, bicarb 14.6, anion gap 22.4, lactic acid 12.1, potassium 3.7, creatinine 1.26, CBC with no significant findings normal urinalysis. Respiratory culture in process. Chest x-ray showed ET tube at a satisfactory position. Bilateral infiltrates//weakness are present pulmonary edema. Infectious multifocal pneumonia is possible. On examination patient pupils are nonreactive to light, fixed and dilated bilaterally. Patient has been admitted for further evaluation and management of hypoxic cardiac arrest secondary to choking, concern for hypoxic ischemic brain injury, acute hypoxic and hypercapnic respiratory failure, severe respiratory and lactic acidosis.                   Plan      Admit inpatient, critical care      Case has been discussed by the ED physician with intensivist on-call Dr. Spears, agreed to consult      Continue Levophed, started vasopressin if needed      Continue amiodarone bolus plus infusion      Currently on volume AC 28/450/10/100%      Repeat ABG in 1 hour, adjust vent settings as per the intensivist      Trend lactic acid      Repeat CBC CMP, mag, Phos, lactic acid, troponin in 2 hours      IV Protonix 80 mg once       CT head without contrast      Teleneurology consult      Empiric treatment with pharmacy to dose Zosyn      Follow-up on CT head without contrast, CT chest abdomen pelvis                   Davis: NPI [id number] Tax ID [id number]     History & Physical        Trisha Huber MD at 23 0646           [facility]  Date: [facility]   Patient Name: Kelechi Crawford  : 1990  MRN: [id number]  Primary Care Physician:  Stephanie Abel APRN  Date of admission: 2023    Subjective  Subjective     Chief Complaint: Cardiac arrest    HPI:    Kelechi Crawford is a 33 y.o. male presented to the ED for evaluation of cardiac arrest. Patient's mother reports that the patient typically has difficulty sleeping at night and reportedly eats meals late at night.  He woke the mother pointing in his neck and chest pantomiming as though he was choking.  She attempted to perform the Heimlich maneuver but due to his large size as he collapsed to the floor he ultimately pulled her down with him.  She attempted to sweep his mouth and perform CPR while waiting for EMS arrival.  When EMS arrived they attempted to continue to clear the patient's oropharynx and attempted intubation multiple times without success.  They report the patient was pulseless on their arrival and in a PEA rhythm.     Patient was reported to be pulseless for approximately 30 to 45 minutes prior to arrival in the emergency department.  He received 3 doses of epinephrine and 1 dose of sodium bicarb in route.  EMS reports they never found a rhythm other than pulseless electrical activity.  ROSC was achieved as well as the patient arrived to the ED.    In the ED, temperature 93.7, started on Olegario hugger, patient noted to be in A-fib with RVR with a heart rate in 160s to 170s, intubated in the ED and is currently on volume AC.  Patient is hypotensive requiring Levophed, currently at maximum dose.  ABG  6.7 9/124/62 on 100% FiO2, normal troponin, normal proBNP, bicarb 14.6, anion gap 22.4, lactic acid 12.1, potassium 3.7, creatinine 1.26, CBC with no significant findings normal urinalysis.  Respiratory culture in process.  Chest x-ray showed ET tube at a satisfactory position.  Bilateral infiltrates//weakness are present pulmonary edema.  Infectious multifocal pneumonia is possible.  On examination patient pupils are nonreactive to light, fixed and dilated bilaterally.  Patient has been admitted for further evaluation and management of hypoxic cardiac arrest secondary to choking, concern for hypoxic ischemic brain injury, acute hypoxic and hypercapnic respiratory failure, severe respiratory and lactic acidosis.  Explained to the patient mother at the bedside regarding poor prognosis.      Personal History     PMH  Schizophrenia    No family history on file.      Social History     Socioeconomic History   • Marital status: Single         Home Medications:  ALPRAZolam, benztropine, hydrOXYzine, mirtazapine, and paliperidone palmitate    Allergies:  No Known Allergies    Review of Systems     Objective  Objective     Vitals:   Temp:  [93.7 °F (34.3 °C)] 93.7 °F (34.3 °C)  Heart Rate:  [119-168] 145  Resp:  [24-28] 27  BP: ()/(36-75) 118/64  FiO2 (%):  [55 %-100 %] 55 %    Physical Exam    Constitutional: Intubated, Obtunded   Eyes: Pupils fixed and dilated, not responsive to light    Respiratory: Clear to auscultation bilaterally, mechanical ventilation    Cardiovascular: RRR, no murmurs, rubs, or gallops, on kary hugger   Gastrointestinal: soft, nondistended   Musculoskeletal: No bilateral ankle edema, no clubbing or cyanosis to extremities   Neurologic: Intubated, obtunded, Pupils fixed and dilated, not responsive to light    Skin: No rashes     Result Review   Result Review:  I have personally reviewed the results from the time of this admission to 12/14/2023 06:52 EST and agree with these findings:  [x]   Laboratory  []  Microbiology  [x]  Radiology  [x]  EKG/Telemetry   []  Cardiology/Vascular   []  Pathology  []  Old records  []  Other:        Imaging Results (Last 24 Hours)       Procedure Component Value Units Date/Time    XR Chest 1 View [[id number]] Collected: 12/14/23 0537     Updated: 12/14/23 0540    Narrative:      PROCEDURE: XR CHEST 1 VW     COMPARISON: None.     INDICATIONS: 33-year-old male w/ respiratory failure; s/p endotracheal (ET) intubation; imaging   follow-up.     FINDINGS: A single AP supine portable chest radiograph reveals low lung volumes with diffuse   bilateral infiltrates.  The findings may represent infectious multifocal pneumonia.  Pulmonary   edema is possible.  Mild cardiomegaly is suspected.  External artifacts obscure detail.  An   endotracheal (ET) tube is in place.  Its distal tip is projected about 5.7 cm above the flash.    The distal nasogastric (NG) tube is projected below the diaphragm.  Its side port is near the   expected gastroesophageal (GE) junction.  There is mild distension of the stomach.  There is slight   asymmetric elevation of the right diaphragm, possibly chronic in nature.  No pneumothorax.    Probably no significant pleural effusion.       Impression:            1. The endotracheal (ET) tube and the nasogastric (NG) tube are thought to be in satisfactory   position radiographically.       2. Bilateral infiltrates are suggested, which may represent pulmonary edema.  Infectious multifocal   pneumonia is possible.       3. There is suspected mild cardiomegaly.       4. No pneumothorax is seen.       5. There is mild gaseous distension of the stomach.       6. Please see above comments for further detail.                 Please note that portions of this note were completed with a voice recognition program.     LEVI DOWD JR, MD         Electronically Signed and Approved By: LEVI DOWD JR, MD on 12/14/2023 at 5:37                                  chlorhexidine, 15 mL, Mouth/Throat, Q12H         Assessment & Plan  Assessment / Plan     Assessment/Plan:   Hypoxic cardiac arrest secondary to choking  Cardiac arrest for approximately 30 to 45 minutes prior to ROSC  Concern for hypoxic ischemic brain injury  Acute hypoxic and hypercapnic respiratory failure  Lactic acidosis  Combined respiratory and metabolic acidosis  A-fib with RVR  Shock  H/o schizophrenia    Plan  Admit inpatient, critical care  Case has been discussed by the ED physician with intensivist on-call Dr. Spears, agreed to consult  Continue Levophed, started vasopressin if needed  Continue amiodarone bolus plus infusion  Currently on volume AC 28/450/10/100%  Repeat ABG in 1 hour, adjust vent settings as per the intensivist  Trend lactic acid  Repeat CBC CMP, mag, Phos, lactic acid, troponin in 2 hours  IV Protonix 80 mg once  CT head without contrast  Teleneurology consult  Empiric treatment with pharmacy to dose Zosyn  Follow-up on CT head without contrast, CT chest abdomen pelvis    DVT prophylaxis:  No DVT prophylaxis order currently exists.    CODE STATUS:    Level Of Support Discussed With: Health Care Surrogate  Code Status (Patient has no pulse and is not breathing): CPR (Attempt to Resuscitate)  Medical Interventions (Patient has pulse or is breathing): Full Support      Admission Status:  I believe this patient meets inpatient status.    Part of this note may be an electronic transcription/translation of spoken language to printed text using the Dragon Dictation System    Trisha Huber MD               Electronically signed by Trisha Huber MD at 12/14/23 0654          Emergency Department Notes        Jennifer Daniels RN at 12/14/23 0714          Mother contact information obtained: [phone]    Electronically signed by Jennifer Daniels RN at 12/14/23 0714       Current Facility-Administered Medications   Medication Dose Route Frequency Provider Last Rate Last Admin   •  amiodarone 360 mg in 200 mL D5W infusion  1 mg/min Intravenous Titrated Trisha Huber MD 33.3 mL/hr at 12/14/23 0646 1 mg/min at 12/14/23 0646   • chlorhexidine (PERIDEX) 0.12 % solution 15 mL  15 mL Mouth/Throat Q12H Trisha Huber MD       • fentaNYL 1000 mcg in 100 mL infusion   mcg/hr Intravenous Titrated Trisha Huber MD   Stopped at 12/14/23 0652   • heparin (porcine) 5000 UNIT/ML injection 5,000 Units  5,000 Units Subcutaneous Q8H Trisha Huber MD       • nitroglycerin (NITROSTAT) SL tablet 0.4 mg  0.4 mg Sublingual Q5 Min PRN Trisha Huber MD       • norepinephrine (LEVOPHED) 8 mg in 250 mL NS infusion (premix)  0.02-0.3 mcg/kg/min Intravenous Titrated Trisha Huber MD   Stopped at 12/14/23 0828   • Pharmacy to Dose Zosyn   Does not apply Continuous PRN Trisha Huber MD       • piperacillin-tazobactam (ZOSYN) 4.5 g/100 mL 0.9% NS IVPB (mbp)  4.5 g Intravenous Once Trisha Huber MD       • sodium chloride 0.9 % flush 10 mL  10 mL Intravenous Q12H Trisha Huber MD   10 mL at 12/14/23 0838   • sodium chloride 0.9 % flush 10 mL  10 mL Intravenous PRN Trisha Huber MD       • sodium chloride 0.9 % infusion 40 mL  40 mL Intravenous PRN rTisha Huber MD       • Vasopressin (VASOSTRICT) 0.2 UNIT/ML solution  0.04 Units/min Intravenous Continuous Trisha Huber MD 12 mL/hr at 12/14/23 0836 0.04 Units/min at 12/14/23 0836     Operative/Procedure Notes (last 24 hours)  Notes from 12/13/23 0904 through 12/14/23 0904   No notes of this type exist for this encounter.       Physician Progress Notes (last 24 hours)  Notes from 12/13/23 0904 through 12/14/23 0904   No notes of this type exist for this encounter.       Consult Notes (last 24 hours)  Notes from 12/13/23 0904 through 12/14/23 0904   No notes of this type exist for this encounter.

## 2023-12-14 NOTE — CONSULTS
"[facility]  [facility] TeleNeurology Consult Services    Stat Consult    Patient Name:   Kelechi Crawford  YOB: 1990  Identification Number:   MRN - [id number]  Date of Service:   12/14/2023 16:37:06    Diagnosis:        G93.1 - Anoxic brain damage, not elsewhere classified    Impression  This is a 33 year old man here status post cardiac arrest, down time at least 30 minutes. CT head done this afternoon shows diffuse cerebral edema and evidence of herniation. This is a tragic case- there is nothing that can be done that will change the outcome here. Mannitol and hypertonic saline- with goal sodium 145-155 can be considered but- this will not change the outcome.      Recommendations:  Our recommendations are outlined below.    Disposition :  Neurology will follow  Additional Recommendations:  Mannitol and hypertonic saline- with goal sodium 145-155 can be considered but- this will not change the outcome Would recommend official brain-death testing (what this entails may vary by institution) If he is not officially braindead- goals of care need to be considered      ----------------------------------------------------------------------------------------------------        Metrics:  [facility] Notification Time: 12/14/2023 16:36:05  Stamp Time: 12/14/2023 16:37:06  Callback Response Time: 12/14/2023 16:38:14    Primary Provider Notified of Diagnostic Impression and Management Plan on: 12/14/2023 17:14:45          ----------------------------------------------------------------------------------------------------    Chief Complaint:  cardiac arrest    History of Present Illness:  Patient is a 33 year old Male.  This is a 33 year old man here status post cardiac arrest. Per verbal report to me at bedside- the patient came into his mother's room at about 4 am- with his hands to his throat (the \"I'm choking\" signal). She reportedly tried the Heimlich and then tried chest compressions. EMS " "arrived- on their arrival he was in PEA arrest. The patient's jaw was reportedly locked so intubation by EMS was not successful. Per ED note- the patient received 3 rounds of epinephrine and sodium bicarb while with EMS. Precise down time is unclear (but above 30 minutes). Per event log in Epic- code start (at this hospital) was 4:58 and code end was 5:05.      Medications:    Anticoagulant use:  Unknown  Antiplatelet use: Unknown  Reviewed EMR for current medications    Allergies:     Allergies Unable To Obtain Due To: Patient Is Obtunded/ Comatose    Social History:  Unable To Obtain Due To Patient Status : Patient Is Obtunded/ Comatose    Family History:    Family History Cannot Be Obtained Because:Patient Is Obtunded/ Comatose    ROS : ROS Cannot Be Obtained Because:  Patient Is Obtunded/ Comatose    Past Surgical History:  Past Surgical History Cannot Be Obtained Because: Patient Is Obtunded/ Comatose  There Is No Surgical History Contributory To Today’s Visit      Examination:  BP(98/60), Pulse(87),    Neuro Exam:    General: Intubated, no response to pain    Speech: Intubated, exam limited    Language: Intubated, exam limited    Face: ET tube in place difficult to determine symmetry    Facial Sensation: Intubated, exam limited    Visual Fields: Intubated, exam limited    Extraocular Movements: Intubated, exam limited    Motor Exam: Intubated, exam limited    Sensation: Intubated, exam limited    Coordination: Intubated, exam limited      Per Intensivist- pupils were equal, not reactive (could not see from the camera); he was reportedly not \"breathing over the vent\" eyes remained fixed in place as the patient's head was moved side to side; there was no withdrawal to pain      Spoke with : Dr Spears (was in the room while I was on video)        Patient / Family was informed the Neurology Consult would occur via TeleHealth consult by way of interactive audio and video telecommunications and consented to receiving " care in this manner.    Patient is being evaluated for possible acute neurologic impairment and high probability of imminent or life - threatening deterioration.I spent total of 35 minutes providing care to this patient, including time for face to face visit via telemedicine, review of medical records, imaging studies and discussion of findings with providers, the patient and / or family.      Dr Mary Kate Bobby      [facility]  For Inpatient follow-up with [facility] physician please call [facility] [phone]. This is not an outpatient service. Post hospital discharge, please contact hospital directly.    Please do not communicate with [facility] physicians via secure chat. If you have any questions, Please contact [facility].

## 2023-12-14 NOTE — CONSULTS
Purpose of the visit was to evaluate for: goals of care/advanced care planning. Spoke with RN and family and discussed palliative care.      Assessment:The patient is on the vent, monitor, cooling blanket, amio, levo and vaso.     Tasks Completed: Emotional Support.    Other Comments: Palliative care spoke with the patients mother Maeve in the conference room and I allowed her space to talk about her family, the incident with her son and how she is doing today. Maeve has not eaten today or taken her own medications so she was encouraged to take a break and go home to get her medications. I offered her a sandwich and to get her a bible for while she was here, at first she wanted these things however later declined when she realized she needed to go home anyway to get her medications. Lots of emotional support was provided.    -Zahra RINCON, RN, [facility]   Palliative Care    12/14/2023 15:14 EST

## 2023-12-14 NOTE — H&P
[facility]   HOSPITALIST HISTORY AND PHYSICAL  Date: 2023   Patient Name: Kelechi Crawford  : 1990  MRN: [id number]  Primary Care Physician:  Stephanie Abel APRN  Date of admission: 2023    Subjective   Subjective     Chief Complaint: Cardiac arrest    HPI:    Kelechi Crwaford is a 33 y.o. male presented to the ED for evaluation of cardiac arrest. Patient's mother reports that the patient typically has difficulty sleeping at night and reportedly eats meals late at night.  He woke the mother pointing in his neck and chest pantomiming as though he was choking.  She attempted to perform the Heimlich maneuver but due to his large size as he collapsed to the floor he ultimately pulled her down with him.  She attempted to sweep his mouth and perform CPR while waiting for EMS arrival.  When EMS arrived they attempted to continue to clear the patient's oropharynx and attempted intubation multiple times without success.  They report the patient was pulseless on their arrival and in a PEA rhythm.     Patient was reported to be pulseless for approximately 30 to 45 minutes prior to arrival in the emergency department.  He received 3 doses of epinephrine and 1 dose of sodium bicarb in route.  EMS reports they never found a rhythm other than pulseless electrical activity.  ROSC was achieved as well as the patient arrived to the ED.    In the ED, temperature 93.7, started on Olegario hugger, patient noted to be in A-fib with RVR with a heart rate in 160s to 170s, intubated in the ED and is currently on volume AC.  Patient is hypotensive requiring Levophed, currently at maximum dose.  ABG 6.7 9/124/62 on 100% FiO2, normal troponin, normal proBNP, bicarb 14.6, anion gap 22.4, lactic acid 12.1, potassium 3.7, creatinine 1.26, CBC with no significant findings normal urinalysis.  Respiratory culture in process.  Chest x-ray showed ET tube at a satisfactory position.  Bilateral  infiltrates//weakness are present pulmonary edema.  Infectious multifocal pneumonia is possible.  On examination patient pupils are nonreactive to light, fixed and dilated bilaterally.  Patient has been admitted for further evaluation and management of hypoxic cardiac arrest secondary to choking, concern for hypoxic ischemic brain injury, acute hypoxic and hypercapnic respiratory failure, severe respiratory and lactic acidosis.  Explained to the patient mother at the bedside regarding poor prognosis.      Personal History     PMH  Schizophrenia    No family history on file.      Social History     Socioeconomic History    Marital status: Single         Home Medications:  ALPRAZolam, benztropine, hydrOXYzine, mirtazapine, and paliperidone palmitate    Allergies:  No Known Allergies    Review of Systems     Objective   Objective     Vitals:   Temp:  [93.7 °F (34.3 °C)] 93.7 °F (34.3 °C)  Heart Rate:  [119-168] 145  Resp:  [24-28] 27  BP: ()/(36-75) 118/64  FiO2 (%):  [55 %-100 %] 55 %    Physical Exam    Constitutional: Intubated, Obtunded   Eyes: Pupils fixed and dilated, not responsive to light    Respiratory: Clear to auscultation bilaterally, mechanical ventilation    Cardiovascular: RRR, no murmurs, rubs, or gallops, on kary hugger   Gastrointestinal: soft, nondistended   Musculoskeletal: No bilateral ankle edema, no clubbing or cyanosis to extremities   Neurologic: Intubated, obtunded, Pupils fixed and dilated, not responsive to light    Skin: No rashes     Result Review    Result Review:  I have personally reviewed the results from the time of this admission to 12/14/2023 06:52 EST and agree with these findings:  [x]  Laboratory  []  Microbiology  [x]  Radiology  [x]  EKG/Telemetry   []  Cardiology/Vascular   []  Pathology  []  Old records  []  Other:        Imaging Results (Last 24 Hours)       Procedure Component Value Units Date/Time    XR Chest 1 View [[id number]] Collected: 12/14/23 0537     Updated:  12/14/23 0540    Narrative:      PROCEDURE: XR CHEST 1 VW     COMPARISON: None.     INDICATIONS: 33-year-old male w/ respiratory failure; s/p endotracheal (ET) intubation; imaging   follow-up.     FINDINGS: A single AP supine portable chest radiograph reveals low lung volumes with diffuse   bilateral infiltrates.  The findings may represent infectious multifocal pneumonia.  Pulmonary   edema is possible.  Mild cardiomegaly is suspected.  External artifacts obscure detail.  An   endotracheal (ET) tube is in place.  Its distal tip is projected about 5.7 cm above the flash.    The distal nasogastric (NG) tube is projected below the diaphragm.  Its side port is near the   expected gastroesophageal (GE) junction.  There is mild distension of the stomach.  There is slight   asymmetric elevation of the right diaphragm, possibly chronic in nature.  No pneumothorax.    Probably no significant pleural effusion.       Impression:            1. The endotracheal (ET) tube and the nasogastric (NG) tube are thought to be in satisfactory   position radiographically.       2. Bilateral infiltrates are suggested, which may represent pulmonary edema.  Infectious multifocal   pneumonia is possible.       3. There is suspected mild cardiomegaly.       4. No pneumothorax is seen.       5. There is mild gaseous distension of the stomach.       6. Please see above comments for further detail.                 Please note that portions of this note were completed with a voice recognition program.     LEVI DOWD JR, MD         Electronically Signed and Approved By: LEVI DOWD JR, MD on 12/14/2023 at 5:37                                 chlorhexidine, 15 mL, Mouth/Throat, Q12H         Assessment & Plan   Assessment / Plan     Assessment/Plan:   Hypoxic cardiac arrest secondary to choking  Cardiac arrest for approximately 30 to 45 minutes prior to ROSC  Concern for hypoxic ischemic brain injury  Acute hypoxic and hypercapnic  respiratory failure  Lactic acidosis  Combined respiratory and metabolic acidosis  A-fib with RVR  Shock  H/o schizophrenia    Plan  Admit inpatient, critical care  Case has been discussed by the ED physician with intensivist on-call Dr. Spears, agreed to consult  Continue Levophed, started vasopressin if needed  Continue amiodarone bolus plus infusion  Currently on volume AC 28/450/10/100%  Repeat ABG in 1 hour, adjust vent settings as per the intensivist  Trend lactic acid  Repeat CBC CMP, mag, Phos, lactic acid, troponin in 2 hours  IV Protonix 80 mg once  CT head without contrast  Teleneurology consult  Empiric treatment with pharmacy to dose Zosyn  Follow-up on CT head without contrast, CT chest abdomen pelvis    DVT prophylaxis:  No DVT prophylaxis order currently exists.    CODE STATUS:    Level Of Support Discussed With: Health Care Surrogate  Code Status (Patient has no pulse and is not breathing): CPR (Attempt to Resuscitate)  Medical Interventions (Patient has pulse or is breathing): Full Support      Admission Status:  I believe this patient meets inpatient status.    Part of this note may be an electronic transcription/translation of spoken language to printed text using the Dragon Dictation System    Trisha Huber MD

## 2023-12-14 NOTE — PLAN OF CARE
Problem: Communication Impairment (Mechanical Ventilation, Invasive)  Goal: Effective Communication  Outcome: Ongoing, Not Progressing     Problem: Device-Related Complication Risk (Mechanical Ventilation, Invasive)  Goal: Optimal Device Function  Outcome: Ongoing, Not Progressing  Intervention: Optimize Device Care and Function  Recent Flowsheet Documentation  Taken 12/14/2023 1600 by Dayami Griffith RN  Airway Safety Measures:   manual resuscitator/mask at bedside   suction at bedside   oxygen flowmeter at bedside     Problem: Inability to Wean (Mechanical Ventilation, Invasive)  Goal: Mechanical Ventilation Liberation  Outcome: Ongoing, Not Progressing  Intervention: Promote Extubation and Mechanical Ventilation Liberation  Recent Flowsheet Documentation  Taken 12/14/2023 1600 by Dayami Griffith RN  Medication Review/Management: medications reviewed     Problem: Nutrition Impairment (Mechanical Ventilation, Invasive)  Goal: Optimal Nutrition Delivery  Outcome: Ongoing, Not Progressing     Problem: Skin and Tissue Injury (Mechanical Ventilation, Invasive)  Goal: Absence of Device-Related Skin and Tissue Injury  Outcome: Ongoing, Not Progressing  Intervention: Maintain Skin and Tissue Health  Recent Flowsheet Documentation  Taken 12/14/2023 1600 by Dayami Griffith RN  Device Skin Pressure Protection:   skin-to-device areas padded   pressure points protected   absorbent pad utilized/changed     Problem: Ventilator-Induced Lung Injury (Mechanical Ventilation, Invasive)  Goal: Absence of Ventilator-Induced Lung Injury  Outcome: Ongoing, Not Progressing  Intervention: Prevent Ventilator-Associated Pneumonia  Recent Flowsheet Documentation  Taken 12/14/2023 1733 by Dayami Griffith RN  Oral Care: suction provided  Taken 12/14/2023 1700 by Dayami Griffith RN  Head of Bed (HOB) Positioning: HOB elevated  Taken 12/14/2023 1600 by Dayami Griffith RN  VAP Prevention Bundle:   HOB elevation maintained   oral care regularly  provided   stress ulcer prophylaxis provided   vent circuit breaks minimized  VAP Prevention Measures: completed  Oral Care:   mouth wash rinse   oral rinse provided   suction provided   swabbed with antiseptic solution     Problem: Palliative Care  Goal: Enhanced Quality of Life  Outcome: Ongoing, Not Progressing  Intervention: Maximize Comfort  Recent Flowsheet Documentation  Taken 12/14/2023 1733 by Dayami Griffith RN  Oral Care: suction provided  Taken 12/14/2023 1600 by Dayami Griffith RN  Oral Care:   mouth wash rinse   oral rinse provided   suction provided   swabbed with antiseptic solution     Problem: Skin Injury Risk Increased  Goal: Skin Health and Integrity  Outcome: Ongoing, Not Progressing  Intervention: Optimize Skin Protection  Recent Flowsheet Documentation  Taken 12/14/2023 1700 by Dayami Griffith RN  Head of Bed (HOB) Positioning: HOB elevated  Taken 12/14/2023 1600 by Dayami Griffith RN  Skin Protection: tubing/devices free from skin contact   Goal Outcome Evaluation:                 Patient GCS 3. No withdrawal to painful stimulus. Pupils fixed and dilated. Shahbaz aware. CT head and neurologist consult completed. MD at bedside to discuss POC with family. Continue with treatment. Pallative on board. Will continue to monitor.

## 2023-12-14 NOTE — PROCEDURES
Bronchoscopy Procedure Note    Procedure:  Bronchoscopy with bronchoalveolar lavage   Airway inspection with airway clearance of secretions.     Pre-Operative Diagnosis:  Respiratory failure   Post-Operative Diagnosis: Same    Indication:  Cardiac arrest, respiratory failure, aspiration    Procedure Details: Patient's mother was consented for the procedure with all risks and benefits of the procedure explained in detail. Patient was given the opportunity to ask questions and all concerns were answered.    The bronchoscope was inserted into the main airway via the ET Tube. An anatomical survey was done of the main airways and the subsegmental bronchus. The findings are reported below. A bronchoalveolar lavage was performed using 50 mL aliquots of normal saline instilled into the airways then aspirated back from RLL of lung with 25 mL serosanguineous fluid in return.  Airway clearance of lung performed with suctioning and removal of secretions and mucous plugs.     Findings:  Purulent secretions  Mucous plugging  Friable mucosa, easy bleeding with suction    Estimated Blood Loss: 15 mL    Specimens:  BAL RLL    Complications: None; patient tolerated the procedure well.    Disposition: To ICU    Patient tolerated the procedure well.    Electronically signed by Silvia Spears MD, 12/14/2023, 15:01 EST.

## 2023-12-14 NOTE — PLAN OF CARE
Goal Outcome Evaluation:      Patient brought into the emergency room this morning in full arrest. ROSC achieved and placed on the ventilator. Patient's initial settings were ac 28, 450, .100, +12. Have since been able to wean the peep to 10 and fio2 to 55%.  Patient has no gag or cough at this time. Sputum has been sent. Patient's saturation at this time is 93% and etco2 is reading in the high 50s to mid 60s. Patient's initial co2 was 124 with a ph of 6.7.

## 2023-12-14 NOTE — PROCEDURES
Procedure Note: Central Line Placement Procedure Note    Indication: Shock, cardiac arrest    Consent obtained: by family.    Time Out: performed at bedside    Procedure: The patient was positioned appropriately and the skin over the right internal jugular vein was prepped with ChloraPrep and draped in sterile fashion.  Local anesthesia over the insertion site was applied with 1% lidocaine.  A large bore needle was then advanced with ultrasound guidance until there was return of dark blood. Guidewire was inserted and site confirmed with ultrasound again. Site was dilated with dilator, and triple lumen catheter was then inserted into the vessel over the guidewire using the Seldinger technique.  All three ports showed good, free flowing blood return and easily flushed with saline.  The catheter was then securely fastened to the skin with sutures and covered with a sterile dressing.      A post-procedure x-ray is pending at this time.    The patient tolerated the procedure well.    Complications: None.    Electronically signed by Silvia Spears MD, 12/14/2023, 11:07 EST.

## 2023-12-14 NOTE — ED PROVIDER NOTES
Time: 5:34 AM EST  Date of encounter:  12/14/2023  Independent Historian/Clinical History and Information was obtained by:   Family and EMS    History is limited by: Acuity of Condition    Chief Complaint: Cardiac arrest      History of Present Illness:  Patient is a 33 y.o. year old male who presents to the emergency department for evaluation of cardiac arrest    Patient's mother reports that the patient typically has difficulty sleeping at night and reportedly eats meals late at night.  He woke the mother pointing in his neck and chest pantomiming as though he was choking.  She attempted to perform the Heimlich maneuver but due to his large size as he collapsed to the floor he ultimately pulled her down with him.  She attempted to sweep his mouth and perform CPR while waiting for EMS arrival.  When EMS arrived they attempted to continue to clear the patient's oropharynx and attempted intubation multiple times without success.  They report the patient was pulseless on their arrival and in a PEA rhythm.    Patient was reported to be pulseless for approximately 30 to 45 minutes prior to arrival in the emergency department.  He received 3 doses of epinephrine and 1 dose of sodium bicarb in route.  EMS reports they never found a rhythm other than pulseless electrical activity.    HPI    Patient Care Team  Primary Care Provider: Stephanie Abel APRN    Past Medical History:     No Known Allergies  History reviewed. No pertinent past medical history.  History reviewed. No pertinent surgical history.  History reviewed. No pertinent family history.    Home Medications:  Prior to Admission medications    Not on File        Social History:   Social History     Tobacco Use    Smoking status: Every Day     Packs/day: 1     Types: Cigarettes   Substance Use Topics    Alcohol use: Yes     Alcohol/week: 42.0 standard drinks of alcohol     Types: 42 Cans of beer per week    Drug use: Not Currently         Review of  "Systems:  Review of Systems   Unable to perform ROS: Patient unresponsive        Physical Exam:  BP 97/53   Pulse 78   Temp 98.6 °F (37 °C)   Resp (!) 7   Ht 170.2 cm (67\")   Wt 121 kg (266 lb 12.1 oz) Comment: with artic sun  SpO2 93%   BMI 41.78 kg/m²     Physical Exam  Vitals and nursing note reviewed.   Constitutional:       Appearance: He is obese.      Comments: Patient is intubated on arrival with a Feliciano supraglottic airway    There is emesis at the mouth and bilateral nares   HENT:      Head: Normocephalic and atraumatic.      Nose: Nose normal.      Comments: NP airway in place     Mouth/Throat:      Mouth: Mucous membranes are moist.   Cardiovascular:      Rate and Rhythm: Tachycardia present. Rhythm irregular.   Pulmonary:      Comments: Back ventilations with breath sounds bilaterally  Abdominal:      General: There is distension.   Genitourinary:     Penis: Normal.       Testes: Normal.   Musculoskeletal:         General: No deformity or signs of injury.   Skin:     General: Skin is dry.      Comments: Cool and dry   Neurological:      GCS: GCS eye subscore is 1. GCS verbal subscore is 1. GCS motor subscore is 1.                Procedures:  Intubation    Date/Time: 12/14/2023 6:10 AM    Performed by: Stevie Sneed MD  Authorized by: Stevie Sneed MD    Consent:     Consent obtained:  Emergent situation  Pre-procedure details:     Indications: airway obstruction and cardio/pulmonary arrest      Patient status:  Unresponsive    Look externally: large tongue      Mouth opening - incisor distance:  2 finger widths    Hyoid-mental distance: 2 finger widths      Hyoid-thyroid distance: 1 finger width      Mallampati score:  III    Obstruction comment:  Emesis in the airway    Neck mobility: normal      Pharmacologic strategy: none      Induction agents:  None    Paralytics:  None  Procedure details:     Preoxygenation:  Supraglottic device    CPR in progress: no      Number of attempts:  2 (Initial " tube was found to have cuff leak and was exchanged over bougie without issue)  Successful intubation attempt details:     Intubation method:  Oral    Intubation technique: video assisted      Laryngoscope blade:  Mac 4    Bougie used: yes      Grade view: I      Tube size (mm):  8.0    Tube type:  Cuffed    Tube visualized through cords: yes    First unsuccessful intubation attempt details:     Intubation method:  Oral    Intubation technique:  Video assisted    Laryngoscope blade:  Mac 4    Bougie used: no      Grade view: I      Tube size (mm):  8.0    Tube type:  Cuffed    Ventilation between 1st and 2nd attempt: no      Tube visualized through cords: yes    Placement assessment:     ETT at teeth/gumline (cm):  24    Tube secured with:  ETT madrigal    Breath sounds:  Equal and absent over the epigastrium    Placement verification: colorimetric ETCO2, CXR verification, direct visualization, equal breath sounds, tube exhalation and waveform ETCO2      CXR findings:  Appropriate position  Post-procedure details:     Procedure completion:  Tolerated well, no immediate complications        Medical Decision Making:      Comorbidities that affect care:    Schizophrenia    External Notes reviewed:    None      The following orders were placed and all results were independently analyzed by me:  Orders Placed This Encounter   Procedures    Intubation    Respiratory Culture - Sputum, ET Suction    BAL Culture, Quantitative - Lavage, Lung, Right Lower Lobe    Fungus Culture - Lavage, Lung    AFB Culture - Lavage, Lung    Viral Culture, Rapid, Respiratory - Swab, Lung, Right Lower Lobe    Pneumonia Panel - Lavage, Lung    XR Chest 1 View    CT Head Without Contrast    CT Chest Without Contrast Diagnostic    CT Abdomen Pelvis Without Contrast    XR Chest 1 View    CT Head Without Contrast    MRI Brain Without Contrast    Comprehensive Metabolic Panel    [place] Draw    Lactic Acid, Plasma    CBC Auto Differential    ABG with  Co-Ox and Electrolytes    Urinalysis With Microscopic If Indicated (No Culture) - Urine, Clean Catch    Single High Sensitivity Troponin T    BNP    Blood Gas, Arterial -Obtain on: Current Settings; With Co-Ox Panel: Yes    STAT Lactic Acid, Reflex    Phosphorus    Magnesium    Comprehensive Metabolic Panel    High Sensitivity Troponin T    STAT Lactic Acid, Reflex    High Sensitivity Troponin T 2Hr    ABG with Co-Ox and Electrolytes    Procalcitonin    Hemoglobin & Hematocrit, Blood    STAT Lactic Acid, Reflex    ABG with Co-Ox and Electrolytes    STAT Lactic Acid, Reflex    Osmolality, Urine - Urine, Clean Catch    Osmolality, Serum    Basic Metabolic Panel    Sodium    CBC Auto Differential    Magnesium    Phosphorus    CBC (No Diff)    Sodium    Basic Metabolic Panel    NPO Diet NPO Type: Strict NPO    Elevate HOB    Oral Care & Teeth Brushing - Intubated Patient    Subglottic Suctioning Must Be Done Every 6 Hours    Spontaneous Awakening Trial    RN May Place Order For ABG As Needed for Respiratory Distress    Vital Signs    Intake & Output    Weigh Patient    Oral Care    Telemetry - Maintain IV Access    Telemetry - Place Orders & Notify Provider of Results When Patient Experiences Acute Chest Pain, Dysrhythmia or Respiratory Distress    Insert Indwelling Urinary Catheter    Assess Need for Indwelling Urinary Catheter - Follow Removal Protocol    Urinary Catheter Care    Apply [place]    Target Arousal Level RASS 0 to -1    Normothermic with goal of 37  Nursing Communication    Insert Rectal Tube    Vital Signs    Pulse Oximetry, Continuous    Neuro Checks    Vital Signs    Strict Intake & Output    Daily Weights    Nurse to calculate the change in sodium with each sodium result, if change exceeds more than 4 mEq in 6 hours, 6 mEq in 12 hours, or 8 mEq in 24 hours, stop infusion and notify provider.    Code Status and Medical Interventions:    Pulmonology (on-call MD unless specified)    Hospitalist  (on-call MD unless specified)    Inpatient Palliative Care Team Consult    Inpatient Neurology Consult Other (see comments) (JENNIFER)    Spontaneous Breathing Trial    Ventilator - Vent Mode: AC/VC; Rate: Other; Rate: 28; FiO2: Titrate Per SpO2; Titrate Oxygen for SpO2: 90 - 95%; PEEP: 10; Tidal Volume: mL; TV: 450    POC Glucose Finger Q6H    POC Glucose Once    POC Glucose Once    POC Glucose Once    POC Glucose Once    POC Glucose Once    POC Glucose Once    POC Glucose Once    POC Glucose Once    POC Glucose Once    ECG 12 Lead Rhythm Change    ECG 12 Lead Bradycardia    Adult Transthoracic Echo Complete w/ Color, Spectral and Contrast if necessary per protocol    Insert Peripheral IV    Inpatient Admission    CBC & Differential    Green Top (Gel)    Lavender Top    Gold Top - SST    Light Blue Top    CBC & Differential       Medications Given in the Emergency Department:  Medications   norepinephrine (LEVOPHED) 8 mg in 250 mL NS infusion (premix) (0.1 mcg/kg/min × 118 kg Intravenous Rate/Dose Change 12/16/23 0453)   chlorhexidine (PERIDEX) 0.12 % solution 15 mL (15 mL Mouth/Throat Given 12/15/23 2057)   Vasopressin (VASOSTRICT) 0.2 UNIT/ML solution (0.04 Units/min Intravenous New Bag 12/16/23 0423)   sodium chloride 0.9 % flush 10 mL (10 mL Intravenous Given 12/15/23 2057)   sodium chloride 0.9 % flush 10 mL (has no administration in time range)   sodium chloride 0.9 % infusion 40 mL (has no administration in time range)   heparin (porcine) 5000 UNIT/ML injection 5,000 Units (5,000 Units Subcutaneous Given 12/16/23 0500)   nitroglycerin (NITROSTAT) SL tablet 0.4 mg (has no administration in time range)   dextrose (GLUTOSE) oral gel 15 g (has no administration in time range)   dextrose (D50W) (25 g/50 mL) IV injection 25 g (has no administration in time range)   glucagon (GLUCAGEN) injection 1 mg (has no administration in time range)   lactated ringers infusion (75 mL/hr Intravenous New Bag 12/16/23 2174)   mannitol  20 % infusion 100 g (100 g Intravenous New Bag 12/16/23 0347)   sodium chloride (HYPERTONIC) 3 % infusion (30 mL/hr Intravenous New Bag 12/15/23 1229)   Pharmacy to Dose Cefepime (has no administration in time range)   cefepime (MAXIPIME) 2000 mg/100 mL 0.9% NS (mbp) (2,000 mg Intravenous New Bag 12/15/23 2241)   sodium chloride (HYPERTONIC) 3 % infusion (30 mL/hr Intravenous New Bag 12/16/23 0345)   EPINEPHrine (ADRENALIN) injection (1 mg Intravenous Given 12/14/23 0459)   sodium chloride 0.9 % bolus 1,000 mL (0 mL Intravenous Stopped 12/14/23 0634)   sodium bicarbonate injection 8.4% 50 mEq (50 mEq Intravenous Given 12/14/23 0548)   calcium chloride injection 1 g (1 g Intravenous Given 12/14/23 0551)   amiodarone 150 mg in 100 mL D5W (loading dose) (0 mg Intravenous Stopped 12/14/23 0643)   pantoprazole (PROTONIX) injection 80 mg (80 mg Intravenous Given 12/14/23 0643)   lactated ringers bolus 1,000 mL (1,000 mL Intravenous New Bag 12/14/23 1550)   magnesium sulfate 4g/100mL (PREMIX) infusion (4 g Intravenous New Bag 12/15/23 1156)   cefepime (MAXIPIME) 2000 mg/100 mL 0.9% NS (mbp) (2,000 mg Intravenous New Bag 12/15/23 1728)   EPINEPHrine (ADRENALIN) 1 MG/10ML injection  - ADS Override Pull (  Override Pull-Not given 12/15/23 1719)   sodium bicarbonate 8.4 % injection 8.4%  - ADS Override Pull (  Override Pull-Not given 12/15/23 1719)        ED Course:    ED Course as of 12/16/23 0600   u Dec 14, 2023   0607 I discussed patient's workup and presentation with the intensivist Dr. Spears, who agrees with current management. [JS]   0619 I discussed patient with the hospitalist.  We discussed the patient's critical condition. [JS]   0620 I discussed patient's poor prognosis with the patient's mother and next of kin at bedside. [JS]      ED Course User Index  [JS] Stevie Sneed MD       Labs:    Lab Results (last 24 hours)       Procedure Component Value Units Date/Time    POC Glucose Once [[id number]]  (Abnormal)  Collected: 12/15/23 0606    Specimen: Blood Updated: 12/15/23 0607     Glucose 104 mg/dL      Comment: Serial Number: [id number]:  [id number]       Phosphorus [[id number]]  (Normal) Collected: 12/15/23 0609    Specimen: Blood, Arterial Line Updated: 12/15/23 0645     Phosphorus 3.5 mg/dL     Magnesium [[id number]]  (Abnormal) Collected: 12/15/23 0609    Specimen: Blood, Arterial Line Updated: 12/15/23 0645     Magnesium 1.5 mg/dL     Comprehensive Metabolic Panel [[id number]]  (Abnormal) Collected: 12/15/23 0609    Specimen: Blood, Arterial Line Updated: 12/15/23 0645     Glucose 122 mg/dL      BUN 8 mg/dL      Creatinine 1.01 mg/dL      Sodium 141 mmol/L      Potassium 3.9 mmol/L      Chloride 108 mmol/L      CO2 22.5 mmol/L      Calcium 8.2 mg/dL      Total Protein 6.2 g/dL      Albumin 3.3 g/dL      ALT (SGPT) 61 U/L      AST (SGOT) 38 U/L      Alkaline Phosphatase 66 U/L      Total Bilirubin 1.2 mg/dL      Globulin 2.9 gm/dL      A/G Ratio 1.1 g/dL      BUN/Creatinine Ratio 7.9     Anion Gap 10.5 mmol/L      eGFR 100.7 mL/min/1.73     Narrative:      GFR Normal >60  Chronic Kidney Disease <60  Kidney Failure <15      CBC & Differential [[id number]]  (Abnormal) Collected: 12/15/23 0609    Specimen: Blood, Arterial Line Updated: 12/15/23 0631    Narrative:      The following orders were created for panel order CBC & Differential.  Procedure                               Abnormality         Status                     ---------                               -----------         ------                     CBC Auto Differential[[id number]]        Abnormal            Final result                 Please view results for these tests on the individual orders.    Osmolality, Serum [[id number]]  (Abnormal) Collected: 12/15/23 0609    Specimen: Blood, Arterial Line Updated: 12/15/23 1008     Osmolality 302 mOsm/kg     Sodium [[id number]]  (Normal) Collected: 12/15/23 0609    Specimen: Blood, Arterial Line Updated: 12/15/23  0645     Sodium 141 mmol/L     CBC Auto Differential [[id number]]  (Abnormal) Collected: 12/15/23 0609    Specimen: Blood, Arterial Line Updated: 12/15/23 0631     WBC 7.70 10*3/mm3      RBC 5.04 10*6/mm3      Hemoglobin 16.1 g/dL      Hematocrit 49.4 %      MCV 98.0 fL      MCH 31.9 pg      MCHC 32.6 g/dL      RDW 13.0 %      RDW-SD 46.5 fl      MPV 10.9 fL      Platelets 150 10*3/mm3      Neutrophil % 77.6 %      Lymphocyte % 13.0 %      Monocyte % 6.6 %      Eosinophil % 2.1 %      Basophil % 0.4 %      Immature Grans % 0.3 %      Neutrophils, Absolute 5.98 10*3/mm3      Lymphocytes, Absolute 1.00 10*3/mm3      Monocytes, Absolute 0.51 10*3/mm3      Eosinophils, Absolute 0.16 10*3/mm3      Basophils, Absolute 0.03 10*3/mm3      Immature Grans, Absolute 0.02 10*3/mm3      nRBC 0.0 /100 WBC     Basic Metabolic Panel [[id number]]  (Abnormal) Collected: 12/15/23 0806    Specimen: Blood Updated: 12/15/23 0837     Glucose 120 mg/dL      BUN 8 mg/dL      Creatinine 1.04 mg/dL      Sodium 143 mmol/L      Potassium 3.9 mmol/L      Chloride 110 mmol/L      CO2 23.1 mmol/L      Calcium 8.2 mg/dL      BUN/Creatinine Ratio 7.7     Anion Gap 9.9 mmol/L      eGFR 97.2 mL/min/1.73     Narrative:      GFR Normal >60  Chronic Kidney Disease <60  Kidney Failure <15      POC Glucose Once [[id number]]  (Abnormal) Collected: 12/15/23 1132    Specimen: Blood Updated: 12/15/23 1134     Glucose 120 mg/dL      Comment: Serial Number: [id number]:  [id number]       Osmolality, Serum [[id number]]  (Normal) Collected: 12/15/23 1254    Specimen: Blood Updated: 12/15/23 1835     Osmolality 300 mOsm/kg     Sodium [[id number]]  (Normal) Collected: 12/15/23 1254    Specimen: Blood Updated: 12/15/23 1320     Sodium 144 mmol/L     POC Glucose Once [[id number]]  (Abnormal) Collected: 12/15/23 1738    Specimen: Blood Updated: 12/15/23 1740     Glucose 129 mg/dL      Comment: Serial Number: [id number]:  [id number]       Osmolality, Serum  [[id number]]  (Abnormal) Collected: 12/15/23 1807    Specimen: Blood Updated: 12/15/23 2303     Osmolality 307 mOsm/kg     Sodium [[id number]]  (Normal) Collected: 12/15/23 1807    Specimen: Blood Updated: 12/15/23 1835     Sodium 145 mmol/L     Basic Metabolic Panel [[id number]]  (Abnormal) Collected: 12/15/23 2025    Specimen: Blood Updated: 12/15/23 2055     Glucose 122 mg/dL      BUN 7 mg/dL      Creatinine 1.00 mg/dL      Sodium 143 mmol/L      Potassium 3.5 mmol/L      Chloride 110 mmol/L      CO2 23.3 mmol/L      Calcium 8.3 mg/dL      BUN/Creatinine Ratio 7.0     Anion Gap 9.7 mmol/L      eGFR 101.9 mL/min/1.73     Narrative:      GFR Normal >60  Chronic Kidney Disease <60  Kidney Failure <15      Basic Metabolic Panel [[id number]]  (Abnormal) Collected: 12/15/23 2235    Specimen: Blood Updated: 12/15/23 2312     Glucose 121 mg/dL      BUN 7 mg/dL      Creatinine 1.09 mg/dL      Sodium 144 mmol/L      Potassium 3.7 mmol/L      Chloride 110 mmol/L      CO2 23.0 mmol/L      Calcium 8.3 mg/dL      BUN/Creatinine Ratio 6.4     Anion Gap 11.0 mmol/L      eGFR 91.9 mL/min/1.73     Narrative:      GFR Normal >60  Chronic Kidney Disease <60  Kidney Failure <15      Osmolality, Serum [[id number]] Collected: 12/16/23 0037    Specimen: Blood Updated: 12/16/23 0043    Sodium [[id number]]  (Abnormal) Collected: 12/16/23 0037    Specimen: Blood Updated: 12/16/23 0103     Sodium 146 mmol/L     POC Glucose Once [[id number]]  (Abnormal) Collected: 12/16/23 0051    Specimen: Blood Updated: 12/16/23 0053     Glucose 136 mg/dL      Comment: Serial Number: [id number]:  [id number]       POC Glucose Once [[id number]]  (Abnormal) Collected: 12/16/23 0502    Specimen: Blood Updated: 12/16/23 0504     Glucose 118 mg/dL      Comment: Serial Number: [id number]:  [id number]                Imaging:    MRI Brain Without Contrast    Result Date: 12/15/2023  PROCEDURE: MRI BRAIN WO CONTRAST  COMPARISON: [facility], CT, CT  HEAD WO CONTRAST, 12/14/2023, 15:39.  INDICATIONS: ANoxic brain injury      TECHNIQUE: A variety of imaging planes and parameters were utilized for visualization of suspected pathology.  Images were performed without contrast.  FINDINGS:  There is swelling of the cerebral and cerebellar cortical tissue.  There remains preservation of the gray-white matter.  Ventricles are not dilated.  There are no abnormal extra-axial fluid collections.  There is cerebellar tonsillar herniation of about 1.9 cm.  It looks like there probably is some uncal herniation as well.  It looks like there is slumping of the frontal lobes with lack of good definition to the sellar area.  There is no restricted diffusion.    The orbits are grossly unremarkable.  There is some minimal paranasal sinus disease and signal in the nasopharynx which could reflect intubation changes.        1. Cortical cerebral edema.  There are findings suggestive of increased intracranial pressure.  There is cerebellar tonsillar herniation as well as what looks like probably some uncal herniation and slumping of frontal lobes with distortion of the anatomy in the sella.  The findings could relate to anoxic event based on history.      CATHERINE GRIFFIN MD       Electronically Signed and Approved By: CATHERINE GRIFFIN MD on 12/15/2023 at 17:21                Differential Diagnosis and Discussion:    Cardiac Arrest: Differential diagnosis includes but is not limited to ventricular tachycardia, ventricular fibrillation, asystole, PEA, respiratory arrest due to hypoxia or metabolic abnormalities.    All labs were reviewed and interpreted by me.  All X-rays impressions were independently interpreted by me.  EKG was interpreted by me.  CT scan radiology impression was interpreted by me.    [facility]       Critical Care Note: Total Critical Care time of 35 minutes. Total critical care time documented does not include time spent on separately billed procedures for services of nurses or  physician assistants. I personally saw and examined the patient. I have reviewed all diagnostic interpretations and treatment plans as written. I was present for the key portions of any procedures performed and the inclusive time noted in any critical care statement. Critical care time includes patient management by me, time spent at the patients bedside,  time to review lab and imaging results, discussing patient care, documentation in the medical record, and time spent with family or caregiver.        Patient Care Considerations:          Consultants/Shared Management Plan:    Hospitalist: I have discussed the case with the hospitalist who agrees to accept the patient for admission.  Consultant: I have discussed the case with intensivist who agrees to consult on the patient.    Social Determinants of Health:    Patient is unable to carry out activities of daily life. Escalation of care is necessary.       Disposition and Care Coordination:    Admit:   Through independent evaluation of the patient's history, physical, and imperical data, the patient meets criteria for observation/admission to the hospital.        Final diagnoses:   Cardiac arrest   Choking due to food in larynx, initial encounter   Acidosis, unspecified   Atrial fibrillation with rapid ventricular response   Frequent PVCs   Myocardial ischemia        ED Disposition       ED Disposition   Decision to Admit    Condition   --    Comment   Level of Care: Critical Care [6]   Diagnosis: Cardiac arrest [427.5.ICD-9-CM]   Certification: I Certify That Inpatient Hospital Services Are Medically Necessary For Greater Than 2 Midnights                 This medical record created using voice recognition software.             Stevie Sneed MD  12/16/23 0600

## 2023-12-14 NOTE — PROCEDURES
Arterial Line Placement    Indication: Hemodynamic monitoring, frequent ABGs    A time-out was completed verifying correct patient, procedure, site, positioning, and special equipment if applicable. Terrell’s test was performed to ensure adequate perfusion. The patient’s left wrist was prepped and draped in sterile fashion. 1% Lidocaine was used to anesthetize the area. A 18G Arrow arterial line was introduced into the left radial artery. The catheter was threaded over the guide wire and the needle was removed with appropriate pulsatile blood return. The catheter was then sutured in place to the skin and a sterile dressing applied. Perfusion to the extremity distal to the point of catheter insertion was checked and found to be adequate.    Estimated Blood Loss: <5 mL    The patient tolerated the procedure well and there were no complications.    Electronically signed by Silvia Spears MD, 12/14/2023, 11:07 EST.

## 2023-12-14 NOTE — CONSULTS
23 1440   Spiritual Care   Use of Spiritual Resources spirituality for coping, indicated strong use of   Spiritual Care Source  initiative   Spiritual Care Follow-Up will follow closely   Response to Spiritual Care emotion expressed;engaged in conversation;receptive of support;thanks expressed;visit helpful   Spiritual Care Interventions prayer support provided;supportive conversation provided   Spiritual Care Visit Type initial   Spiritual Care Request family support;prayer support;spiritual/moral support   Receptivity to Spiritual Care unresponsive  (visit made with pt's mother at bedside)     Visit made with pt's mother, Maeve, at bedside after she was updated by Dr. Spears and ISAMAR Nicolas. Space provided for her to share about her son and that she had another son die when he was in his 20s. She also shared that her father  within the past year as well. Emotional support provided. Pt's mother has spent a significant amount of time in the hospital chapel this morning and asked for  to pray with her over her son. Prayer provided according to pt's tradition.

## 2023-12-14 NOTE — CONSULTS
Pulmonary / Critical Care Consult Note      Patient Name: Kelechi Crawford  : 1990  MRN: [id number]  Primary Care Physician:  Stephanie Abel APRN  Referring Physician: Anjel Ferreira DO  Date of admission: 2023    Subjective   Subjective     Reason for Consult/ Chief Complaint:   Cardiac arrest    HPI:  Kelechi Crawford is a 33 y.o. male no significant past medical history presents to the ED after family member found the patient choking at night.  Per report and patient's mother patient woke her up with his hands to his neck and chest panting as though he was choking.  She attempted to perform the Heimlich maneuver but was unable to do so due to body habitus and he collapsed to the floor.  She attempted to sweep his mouth and perform CPR until EMS arrived.  EMS failed intubate multiple times due to patient clenching his teeth.  On arrival to the ED patient was and PEA arrest.  ACLS was run for 30 to 45 minutes per report.  He received 3 doses of epinephrine and 1 dose of sodium bicarb on route.  Initial labs show severe acidosis ABG 6.7 9/124/62.  Lactic acid 12.4.  Repeat lactate 12.1.  CT chest largely unremarkable other than some atelectasis.  CT head showed concern for diffuse cerebral edema.  On arrival to the ICU patient is intubated.  He has 2 peripheral IVs and a IO on his lower left extremity.  Left radial A-line and right IJ central line placed by me.  He is currently on norepinephrine and vasopressin as well as amiodarone drip.  Monitor shows frequent abnormal PVCs and tachycardia.  He is now off sedation at this time.    Review of Systems  Unable to obtain      Personal History     No past medical history on file.    No past surgical history on file.    Family History: family history is not on file. Otherwise pertinent FHx was reviewed and not pertinent to current issue.    Social History:      Home Medications:  ALPRAZolam, benztropine, hydrOXYzine, mirtazapine, and  paliperidone palmitate    Allergies:  No Known Allergies    Objective    Objective     Vitals:   Temp:  [93.7 °F (34.3 °C)-94.6 °F (34.8 °C)] 94.6 °F (34.8 °C)  Heart Rate:  [] 94  Resp:  [7-28] 7  BP: ()/(36-75) 118/64  FiO2 (%):  [40 %-100 %] 40 %    Physical Exam:  Vital Signs Reviewed   General:  WDWN, NAD.  Lying in bed  HEENT:  PERRL, EOMI.  OP, nares clear  Neck:  Supple, no JVD, no thyromegaly  Chest: Diminished to auscultation bilaterally, tympanic to percussion bilaterally, no work of breathing noted on mechanical ventilator  CV: RRR, no MGR, pulses 2+, equal.  Abd:  Soft, NT, ND, + BS, no HSM, obese  EXT:  no clubbing, no cyanosis, no edema  Neuro:  A&Ox0, pupils bilaterally dilated and nonreactive, as gag and cough, CN grossly intact, no focal deficits.  Skin: No rashes or lesions noted      Result Review    Result Review:  I have personally reviewed the results from the time of this admission to 12/14/2023 12:36 EST and agree with these findings:  []  Laboratory  []  Microbiology  []  Radiology  []  EKG/Telemetry   []  Cardiology/Vascular   []  Pathology  []  Old records  []  Other:  Most notable findings include:     Assessment & Plan   Assessment / Plan     Active Hospital Problems:  Active Hospital Problems    Diagnosis     **Cardiac arrest        Impression:  Cardiac arrest  Acute hypoxic and hypercarbic respiratory failure on mechanical ventilator  Suspect choking leading to cardiac arrest  Cerebral edema on CT  Severe acidosis  Obesity    Plan:  -Intubated on pressure support.  Continue for now as patient has no intrinsic lung disease.  -Keep off sedation.  Patient does not respond to pain or verbal stimuli.  Pupils are bilaterally dilated and nonreactive.  Has cough and gag at this time.  -Maintain normothermia  -Will reassess neurostatus once metabolic derangement have been addressed.  On CT he has cerebral edema.  -Pressors: On arrival to the ICU on levo and vaso.  Continue to wean  as tolerated keep MAP greater than 65  -Continue amiodarone drip for now  -Cont aspiration precautions. Keep HOB 30 deg.   -NG tube to suction  -Broad-spectrum antibiotic given in the ED.  Will transition to Unasyn as he likely aspirated  -Continue to trend lactate to clearance  -A-line and central line placed  -Replace electrolytes PRN to keep K 4.0, Mag 2.0, Phos 4.0.  -Keep glucose 140-180 while in ICU. Cont SSI.  -Transfuse to keep Hgb >7  -DVT ppx: Heparin subcu  -GI ppx: PPI  -Lines: Peripheral IVs, left radial A-line, right IJ central line placed 12/13/2023    DVT prophylaxis:  Medical DVT prophylaxis orders are present.     Code Status and Medical Interventions:   Ordered at: 12/14/23 0652     Level Of Support Discussed With:    Health Care Surrogate     Code Status (Patient has no pulse and is not breathing):    CPR (Attempt to Resuscitate)     Medical Interventions (Patient has pulse or is breathing):    Full Support        The patient is critically ill in the ICU with cardiac arrest likely secondary to hypoxia due to choking. Multidisciplinary bedside critical care rounds were performed with nursing staff, respiratory therapy, pharmacy, nutritional services, social work. I have personally reviewed the chart, labs and any pertinent imaging available.  I have spent 40 minutes of critical care time, excluding procedures, in the care of this patient.    Electronically signed by Silvia Spears MD, 12/14/23, 12:36 PM EST.

## 2023-12-14 NOTE — Clinical Note
Level of Care: Telemetry [5]   Diagnosis: Cardiac arrest [427.5.ICD-9-CM]   Certification: I Certify That Inpatient Hospital Services Are Medically Necessary For Greater Than 2 Midnights

## 2023-12-15 PROBLEM — J96.01 ACUTE RESPIRATORY FAILURE WITH HYPOXIA AND HYPERCAPNIA: Status: ACTIVE | Noted: 2023-01-01
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
## 2023-12-15 PROBLEM — J15.3: Status: ACTIVE | Noted: 2023-01-01

## 2023-12-15 PROBLEM — G93.1 ANOXIC BRAIN INJURY: Status: ACTIVE | Noted: 2023-01-01

## 2023-12-15 PROBLEM — T17.320A CHOKING DUE TO FOOD IN LARYNX: Status: ACTIVE | Noted: 2023-01-01

## 2023-12-15 PROBLEM — J96.02 ACUTE RESPIRATORY FAILURE WITH HYPOXIA AND HYPERCAPNIA: Status: ACTIVE | Noted: 2023-01-01

## 2023-12-15 PROBLEM — R79.89 ELEVATED TROPONIN: Status: ACTIVE | Noted: 2023-01-01

## 2023-12-15 PROBLEM — R79.89 HIGH SERUM LACTATE: Status: ACTIVE | Noted: 2023-01-01

## 2023-12-15 NOTE — PROGRESS NOTES
[facility]     Progress Note    Patient Name: Kelechi Crawford  : 1990  MRN: [id number]  Primary Care Physician:  Stephanie Abel APRN  Date of admission: 2023    Subjective   Subjective       Chief Complaint:   Patient is critically ill in ICU with out of hospita cardiac arrest    Over last 24 hours,   \Out of hospital arrest  Intubated in unit on levo and vaso; normothermic protocol in place  Remains off sedation  Continued on Unasyn  Central line and arterial line placed  FMS placed due to multiple liquid stools  Initially with spontaneous respirations and tolerating SBT, mid afternoon patient went apneic and required assist-control without spontaneous initiation of respiration  Repeat CT of brain was with worsening in cerebral edema and herniation  Family made aware of acute changes    Overnight  Started on mannitol and hypertonic saline    This morning,  No cough, gag or spontaneous respirations  Pupils are fixed and dilated  Family at bedside    Review of Systems   Unable to obtain due to patient's unresponsive status      Objective   Objective     Vitals:   Temp:  [95.2 °F (35.1 °C)-98.9 °F (37.2 °C)] 98.6 °F (37 °C)  Heart Rate:  [57-99] 95  Resp:  [7] 7  BP: ()/(53-76) 101/56  FiO2 (%):  [40 %-45 %] 45 %  Physical Exam   Vital Signs Reviewed   General:  NAD. Young male, lying in bed  HEENT:  PERRL, EOMI.    Neck:  No JVD, no thyromegaly  Lymph: no axillary, cervical, supraclavicular lymphadenopathy noted bilaterally  Chest:  Clear to auscultation bilaterally, no work of breathing noted on mechanical ventilator  CV: RRR, no M/G/R, pulses 2+  Abd:  Soft, NT, ND, +BS  EXT:  no clubbing, no cyanosis, no edema  Neuro:  A&Ox0, CN grossly intact, no cough, no gag, pupils fixed and dilated  Skin: No rashes or lesions noted    Result Review    Result Review:  I have personally reviewed the results from the time of this admission to 12/15/2023 13:33 EST and agree with these  findings:  []  Laboratory  []  Microbiology  []  Radiology  []  EKG/Telemetry   []  Cardiology/Vascular   []  Pathology  []  Old records  []  Other:  Most notable findings include:       Lab 12/15/23  1254 12/15/23  0806 12/15/23  0609 12/15/23  0012 12/14/23  1945 12/14/23  1512 12/14/23  1238 12/14/23  1207 12/14/23  0556 12/14/23  0512 12/14/23  0512 12/14/23  0509   WBC  --   --  7.70  --   --   --   --   --   --   --   --  9.67   HEMOGLOBIN  --   --  16.1  --   --   --  18.7*  --   --   --   --  16.6   HEMATOCRIT  --   --  49.4  --   --   --  54.7*  --   --   --   --  51.5*   PLATELETS  --   --  150  --   --   --   --   --   --   --   --  143   SODIUM 144 143 141  141 140 140  --   --   --  139  --   --   --    SODIUM, ARTERIAL  --   --   --   --   --  140.4  --  141.2  --    < > 143.4  --    POTASSIUM  --  3.9 3.9  --  4.0  --   --   --  3.7  --   --   --    CHLORIDE  --  110* 108*  --  106  --   --   --  102  --   --   --    CO2  --  23.1 22.5  --  20.7*  --   --   --  14.6*  --   --   --    BUN  --  8 8  --  11  --   --   --  8  --   --   --    CREATININE  --  1.04 1.01  --  0.88  --   --   --  1.26  --   --   --    GLUCOSE  --  120* 122*  --  146*  --   --   --  338*  --   --   --    GLUCOSE, ARTERIAL  --   --   --   --   --  119*  --  82  --   --  371*  --    CALCIUM  --  8.2* 8.2*  --  8.4*  --   --   --  10.1  --   --   --    PHOSPHORUS  --   --  3.5  --   --   --   --   --   --   --   --   --    TOTAL PROTEIN  --   --  6.2  --   --   --   --   --  5.7*  --   --   --    ALBUMIN  --   --  3.3*  --   --   --   --   --  3.2*  --   --   --    GLOBULIN  --   --  2.9  --   --   --   --   --  2.5  --   --   --     < > = values in this interval not displayed.         Microbiology Results (last 10 days)       Procedure Component Value - Date/Time    BAL Culture, Quantitative - Lavage, Lung, Right Lower Lobe [[id number]]  (Abnormal) Collected: 12/14/23 8626    Lab Status: Preliminary result Specimen: Lavage from  Lung, Right Lower Lobe Updated: 12/15/23 0916     BAL Culture >100,000 CFU/mL Streptococcus agalactiae (Group B)     Comment:   This organism is considered to be universally susceptible to penicillin.  No further antibiotic testing will be performed. If Clindamycin or Erythromycin is the drug of choice, notify the laboratory within 7 days to request susceptibility testing.         25,000 CFU/mL The culture consists of normal respiratory reginald. This is a preliminary report; final report to follow.     Gram Stain Moderate (3+) WBCs seen      Moderate (3+) Gram positive cocci in pairs and chains      Rare (1+) Gram negative bacilli    AFB Culture - Lavage, Lung [[id number]] Collected: 12/14/23 1443    Lab Status: Preliminary result Specimen: Lavage from Lung Updated: 12/15/23 0918     AFB Stain No acid fast bacilli seen on direct smear      No acid fast bacilli seen on concentrated smear    Pneumonia Panel - Lavage, Lung [[id number]]  (Abnormal) Collected: 12/14/23 1443    Lab Status: Final result Specimen: Lavage from Lung Updated: 12/14/23 1742     Escherichia coli PCR Not Detected     Acinetobacter calcoaceticus-baumannii complex PCR Not Detected     Enterobacter cloacae PCR Detected     Comment: 10^4 Bin copies/mL        Klebsiella oxytoca PCR Not Detected     Klebsiella pneumoniae group PCR Not Detected     Klebsiella aerogenes PCR Not Detected     Moraxella catarrhalis PCR Not Detected     Proteus species PCR Not Detected     Pseudomonas aeroginosa PCR Not Detected     Serratia marcescens PCR Not Detected     Staphylococcus aureus PCR Not Detected     Streptococcus pyogenes PCR Not Detected     Haemophilus influenzae PCR Not Detected     Streptococcus agalactiae PCR Detected     Comment: >=10^7 Bin copies/mL        Streptococcus pneumoniae PCR Not Detected     Chlamydophila pneumoniae PCR Not Detected     Legionella pneumophilia PCR Not Detected     Mycoplasma pneumo by PCR Not Detected     ADENOVIRUS, PCR Not  Detected     CTX-M Gene Not Detected     IMP Gene Not Detected     KPC Gene Not Detected     mecA/C and MREJ Gene N/A     NDM Gene Not Detected     OXA-48-like Gene Not Detected     VIM Gene Not Detected     Coronavirus Not Detected     Human Metapneumovirus Not Detected     Human Rhinovirus/Enterovirus Not Detected     Influenza A PCR Not Detected     Influenza B PCR Not Detected     RSV, PCR Not Detected     Parainfluenza virus PCR Not Detected    Respiratory Culture - Sputum, ET Suction [[id number]] Collected: 12/14/23 0614    Lab Status: Preliminary result Specimen: Sputum from ET Suction Updated: 12/15/23 0902     Respiratory Culture Moderate growth (3+) The culture consists of normal respiratory reginald. This is a preliminary report; final report to follow.     Gram Stain Few (2+) WBCs seen      Few (2+) Gram positive cocci in pairs and clusters             Assessment & Plan   Assessment / Plan         Active Hospital Problems:  Active Hospital Problems    Diagnosis     **Cardiac arrest      Impression:  Cardiac arrest  Acute hypoxic and hypercarbic respiratory failure on mechanical ventilator  Suspect choking leading to cardiac arrest  Anoxic brain injury  Cerebral edema on CT  Severe acidosis  Obesity     Plan:  -Teleneurology on board.  Recommended MRI brain without contrast  -Continues on hypertonic 3% saline infusion and mannitol  -Based on mechanical ventilation with AC/VC settings.    -Titrate FiO2 to maintain SpO2 90% or greater.  -Keep off sedation.  Pupils are bilaterally dilated and nonreactive.  No cough, gag or response to painful stimuli  -Maintain normothermia  -CT personally reviewed and discussed with neurologist.  Repeat CT scan with increase edema throughout the brain parenchyma resulting in uncal herniation and cerebellar tonsillar herniation  -Pressors: Continue on levo and vaso.  Continue to wean as tolerated keep MAP greater than 65  -Amiodarone drip on hold  -Cont aspiration precautions.  Keep HOB 30 deg.   -NG tube to suction  -Currently on Unasyn.  Will DC and start cefepime.  Pneumonia panel positive for Streptococcus a and Enterococcus cloacae  -Continue to trend lactate to clearance  -A-line and central line in place  -Replace electrolytes PRN to keep K 4.0, Mag 2.0, Phos 4.0.  -Keep glucose 140-180 while in ICU. Cont SSI.  -Transfuse to keep Hgb >7  -DVT ppx: Heparin subcu  -GI ppx: PPI  -Lines: Peripheral IVs, left radial A-line, right IJ central line placed 12/13/2023    Updated family regarding patient's extremely poor prognosis with minimal chance of meaning recovering.     DVT prophylaxis:  Medical DVT prophylaxis orders are present.    CODE STATUS:   Level Of Support Discussed With: Health Care Surrogate  Code Status (Patient has no pulse and is not breathing): CPR (Attempt to Resuscitate)  Medical Interventions (Patient has pulse or is breathing): Full Support      Maria Isabel MERA PA spent 15 minutes in accordance with split shared billing.  Electronically signed by ISAMAR Crow, 12/15/23, 1:44 PM EST.    Patient is critically ill with cardiac arrest secondary to respiratory failure, on mechanical ventilator, anoxic brain injury. 35 minutes of critical care time was spent managing this patient, excluding procedures. Of this time, Dr. Silvia MERA, spent 20 minutes and ISAMAR Mckeon spent 15 minutes in accordance with split shared billing. This included personally reviewing all pertinent labs, imaging, microbiology and documentation. Also discussing the case with the patient and any available family, the admitting physician and any available ancillary staff.   Electronically signed by Silvia Spears MD, 12/15/23, 3:58 PM EST.

## 2023-12-15 NOTE — PROGRESS NOTES
[facility]  [facility] Tele[facility]urology Consult Services    Routine Consult Follow-Up    Patient Name:   Kelechi Crawford  YOB: 1990  Identification Number:   MRN - [id number]  Date of Service:   12/15/2023 08:28:46    Diagnosis        G93.1 - Anoxic brain damage, not elsewhere classified    Impression  This is a 33 year old man here status post cardiac arrest, down time at least 30 minutes. CT head done shows diffuse cerebral edema and evidence of herniation. Currently on mannitol and hypertonic saline. Goal Sodium 145-155. MRI brain pending although CT head shows diffuse brain injury. Overall, suspect poor prognosis for meaningful neurological recovery.    Our recommendations are outlined below    Nursing Recommendations :  Continue with Telemetry      Disposition :  Neurology will follow    Subjective  patient seen and examined.      Examination    Coma Exam:    Ventilator:  Yes  Sedated: No  Paralyzed: No  Breathes spontaneously above ventilator rate: No    Responds to Voice:  No    Responds to Sternal rub:  No    Withdraws limb from painful stimulation:  No    Spontaneous limb movement:   Right arm: No  Left arm: No  Right leg: No  Left leg: No    Pupils:  Fixed    Corneal Reflexes:  Absent    Oculocephalic Reflexes:  Absent    Cough/Gag Reflexes:  Absent          Patient/Family was informed the Neurology Consult would happen via TeleHealth consult by way of interactive audio and video telecommunications and consented to receiving care in this manner.    Telehealth Neurology consultation was provided. I spent minutes providing telehealth care. This includes time spent for face to face visit via telemedicine, review of medical records, imaging studies and discussion of findings with providers, the patient and/or family.      Dr Adama Caban      [facility]  For Inpatient follow-up with [facility] physician please call [facility] [phone]. This is not an outpatient service.  Post hospital discharge, please contact hospital directly.    Please do not communicate with [facility] physicians via secure chat. If you have any questions, Please contact [facility].

## 2023-12-15 NOTE — PLAN OF CARE
Goal Outcome Evaluation:   Patient is currently on AC/VC RR24, vT450, P+10 and 60% FIO2.   Patient has no ET secretions and does not have a cough reflex upon suctioning.

## 2023-12-15 NOTE — SIGNIFICANT NOTE
12/15/23 1200   Spiritual Care   Use of Spiritual Resources spirituality for coping, indicated strong use of   Spiritual Care Source family request   Spiritual Care Follow-Up will follow closely   Response to Spiritual Care emotion expressed;engaged in conversation;thanks expressed;receptive of support   Spiritual Care Interventions supportive conversation provided;theological discussion provided   Spiritual Care Visit Type follow-up   Spiritual Care Request decision-making support;family support;hospitality support;prayer support;spiritual/moral support   Receptivity to Spiritual Care visit welcomed;unresponsive  (visit made with pt's mother at bedside)     Support provided to pt's mother at bedside after she was updated on her son's status by ICU team. Mother expressed not being able to sleep last night and appeared visibly tired. Assisted in providing a recliner in pt's room so she can rest.

## 2023-12-15 NOTE — CONSULTS
"Nutrition Services    Patient Name: Kelechi Crawford  YOB: 1990  MRN: [id number]  Admission date: 12/14/2023      CLINICAL NUTRITION ASSESSMENT      Reason for Assessment  Identified at Risk by Screening Criteria      H&P:  History reviewed. No pertinent past medical history.     Current Problems:   Active Hospital Problems    Diagnosis     **Cardiac arrest         Nutrition/Diet History         Narrative   Patient admitted to CCU S/P out-of-hospital cardiac arrest.  Patient is currently intubated.  Not requiring sedation.      Discussed in multidisciplinary rounds.  Patient is currently requiring multiple vasopressors.  Absent bowel sounds.  Will remain NPO at this time.       Anthropometrics        Current Height, Weight Height: 170.2 cm (67\")  Weight: 116 kg (255 lb 11.7 oz)   Current BMI Body mass index is 40.05 kg/m².   BMI Classification Obese Class III   % %   Adjusted Body Weight (ABW) 79.5 kg   Weight Hx  Wt Readings from Last 30 Encounters:   12/15/23 0400 116 kg (255 lb 11.7 oz)   12/14/23 1613 117 kg (257 lb 11.5 oz)   12/14/23 0818 117 kg (257 lb 11.5 oz)   12/14/23 0533 118 kg (260 lb 9.3 oz)          Wt Change Observation UTD     Estimated/Assessed Needs  Estimated Needs based on: Critical Care Guidelines       Energy Requirements 11-14 kcal/kg CBW   EST Needs (kcal/day) 9633-6753       Protein Requirements 2.0 g/kg IBW   EST Daily Needs (g/day) 135       Fluid Requirements 25 ml/kg IBW    Estimated Needs (mL/day) 1683     Labs/Medications         Pertinent Labs Reviewed.   Results from last 7 days   Lab Units 12/15/23  0806 12/15/23  0609 12/15/23  0012 12/14/23  1945 12/14/23  1207 12/14/23  0556   SODIUM mmol/L 143 141  141 140 140  --  139   SODIUM, ARTERIAL   --   --   --   --    < >  --    POTASSIUM mmol/L 3.9 3.9  --  4.0  --  3.7   CHLORIDE mmol/L 110* 108*  --  106  --  102   CO2 mmol/L 23.1 22.5  --  20.7*  --  14.6*   BUN mg/dL 8 8  --  11  --  8   CREATININE " "mg/dL 1.04 1.01  --  0.88  --  1.26   CALCIUM mg/dL 8.2* 8.2*  --  8.4*  --  10.1   BILIRUBIN mg/dL  --  1.2  --   --   --  0.3   ALK PHOS U/L  --  66  --   --   --  64   ALT (SGPT) U/L  --  61*  --   --   --  62*   AST (SGOT) U/L  --  38  --   --   --  67*   GLUCOSE mg/dL 120* 122*  --  146*  --  338*   GLUCOSE, ARTERIAL   --   --   --   --    < >  --     < > = values in this interval not displayed.     Results from last 7 days   Lab Units 12/15/23  0609   MAGNESIUM mg/dL 1.5*   PHOSPHORUS mg/dL 3.5   HEMOGLOBIN g/dL 16.1   HEMATOCRIT % 49.4     No results found for: \"COVID19\"  No results found for: \"HGBA1C\"      Pertinent Medications Reviewed.     Malnutrition Severity Assessment                Nutrition Diagnosis         Nutrition Dx Problem 1 Inadequate energy Intake related to Inability to consume sufficient energy as evidenced by NPO.       Nutrition Intervention           Current Nutrition Orders & Evaluation of Intake       Current PO Diet NPO Diet NPO Type: Strict NPO   Supplement No active supplement orders           Nutrition Intervention/Prescription        Patient to remain NPO at this time.          Medical Nutrition Therapy/Nutrition Education          Learner     Readiness Patient  Education not appropriate at this time     Method     Response N/A  N/A     Monitor/Evaluation        Monitor Per protocol, I&O, POC/GOC       Nutrition Discharge Plan         To be determined       Electronically signed by:  Pinky Spears, VIKTOR  12/15/23 12:44 EST    "

## 2023-12-15 NOTE — NURSING NOTE
The patient is on CCU, on the vent, jackson to BSD, warming blanket, IV fluids running. The patients mother is at the bedside and expressed being very tired. Staff and I offered her a sandwich, a pillow and suggested several areas to take a nap. Emotional support provided and the patients mother was allowed space to just vent and discuss her thoughts.  Natalia was updated and she provided support. The patient is scheduled for an MRI today.    -Zahra RINCON, RN, [facility]   Palliative Care    12/15/2023 13:09 EST

## 2023-12-15 NOTE — PROGRESS NOTES
[facility]   Hospitalist Progress Note  Date: 12/15/2023  Patient Name: Kelechi Crawford  : 1990  MRN: [id number]  Date of admission: 2023      Subjective   Subjective     Chief Complaint: Follow up for cardiac arrest    Interval Followup:   Remains unresponsive off sedation  Pupils fixed and dilated  No cough or gag reflex  On Levophed and vasopressor  Had apneic as episodes on spontaneous breathing trial and now back on assist control ventilation  CT of the head shows worsening edema and herniation    Review of Systems  UTO; intubated.  Unresponsive      Objective   Objective     Vitals:   Temp:  [95.2 °F (35.1 °C)-98.9 °F (37.2 °C)] 98.6 °F (37 °C)  Heart Rate:  [57-98] 96  BP: ()/(53-76) 135/75  FiO2 (%):  [40 %-60 %] 60 %  Physical Exam    Constitutional: Young male.  Critically ill. On mechanical ventilator   Eyes: Pupils dilated and fixed   HENT: OP with ET tube   Respiratory: Equal ventilated breath sounds bilaterally   Cardiovascular: Regular, tachycardic.   Neurologic: Unresponsive.  Does not withdraw to pain.  No spontaneous limb movement.  No cough or gag reflex   Skin: Extremities warm, no rashes, no mottling   : +jackson catheter    Result Review    Result Review:  I have personally reviewed the following over the last 24 hours (07:00 to 07:00) and agree with the following findings  [x]  Laboratory  CBC          2023    05:09 2023    12:38 12/15/2023    06:09   CBC   WBC 9.67   7.70    RBC 5.18   5.04    Hemoglobin 16.6  18.7  16.1    Hematocrit 51.5  54.7  49.4    MCV 99.4   98.0    MCH 32.0   31.9    MCHC 32.2   32.6    RDW 15.9   13.0    Platelets 143   150      CMP          2023    05:12 2023    05:56 2023    12:07 2023    15:12 2023    19:45 12/15/2023    00:12 12/15/2023    06:09 12/15/2023    08:06 12/15/2023    12:54   CMP   Glucose 371  338  82  119  146   122  120     BUN  8    11   8  8     Creatinine  1.26    0.88    1.01  1.04     EGFR  77.2    116.4   100.7  97.2     Sodium 143.4  139  141.2  140.4  140  140  141     141  143  144    Potassium  3.7    4.0   3.9  3.9     Chloride  102    106   108  110     Calcium  10.1    8.4   8.2  8.2     Total Protein  5.7      6.2      Albumin  3.2      3.3      Globulin  2.5      2.9      Total Bilirubin  0.3      1.2      Alkaline Phosphatase  64      66      AST (SGOT)  67      38      ALT (SGPT)  62      61      Albumin/Globulin Ratio  1.3      1.1      BUN/Creatinine Ratio  6.3    12.5   7.9  7.7     Anion Gap  22.4    13.3   10.5  9.9       [x]  Microbiology  BAL culture  >100,000 CFU/mL Streptococcus agalactiae (Group B) Abnormal    [x]  Radiology images personally reviewed  [x]  EKG/Telemetry monitor personally reviewed: Sinus tachycardia  [x]  Cardiology/Vascular   TTE  Left Ventricle Left ventricular systolic function is mildly decreased. Left ventricular ejection fraction appears to be 41 - 45%.   Septal wall motion is normal. Normal left ventricular cavity size noted. Left ventricular wall thickness is consistent with mild concentric hypertrophy. There is left ventricular global hypokinesis noted. There is mild global hypokinesis of the left ventricle. Left ventricular diastolic function is consistent with (grade I) impaired relaxation.   Right Ventricle Normal right ventricular cavity size and systolic function noted.   Left Atrium Normal left atrial size and volume noted.   Right Atrium Normal right atrial cavity size noted. The diameter of the inferior vena cava is 1.5 cm.   Aortic Valve The aortic valve is structurally normal with no regurgitation or stenosis present. The aortic valve appears trileaflet.   Mitral Valve The mitral valve is grossly normal in structure. Trace mitral valve regurgitation is present.   Tricuspid Valve The tricuspid valve is grossly normal in structure. Trace tricuspid valve regurgitation is present. Insufficient TR velocity profile to estimate  the right ventricular systolic pressure.   Pulmonic Valve The pulmonic valve is not well visualized.   Greater Vessels No dilation of the aortic root is present. The inferior vena cava is normally sized. Normal IVC inspiratory collapse of greater than 50% noted.   Pericardium The pericardium is normal. There is no evidence of pericardial effusion. .     []  Pathology  []  Old records  [x]  Other:    Intake/Output Summary (Last 24 hours) at 12/15/2023 1646  Last data filed at 12/15/2023 0600  Gross per 24 hour   Intake 4638.16 ml   Output 3290 ml   Net 1348.16 ml         Assessment & Plan   Assessment / Plan     Assessment/Plan:  Active Hospital Problems    Diagnosis  POA    **Cardiac arrest [I46.9]  Yes    Choking due to food in larynx [T17.320A, W44.F3XA]  Unknown    Acute respiratory failure with hypoxia and hypercapnia [J96.01, J96.02]  Yes    Anoxic brain injury [G93.1]  Unknown    Elevated troponin [R79.89]  Yes    High serum lactate [R79.89]  Yes    Group B streptococcal pneumonia [J15.3]  Unknown         Continue ICU level care  Appreciate recommendations from Intensivist and TeleNeurology  Unfortunately CT scan is showing worsening edema and uncal/cerebellar tonsillar herniation.  MRI brain without contrast has been ordered and pending  Continue hypertonic saline and mannitol; per neurology. Goal sodium 145-55.  Monitor sodium levels every 6 hours  Continue Levophed and vasopressin to maintain MAP >65  Vent management per intensivist  Continue LR at 75 cc/h  Continue IV cefepime to cover for PNA  Monitor POC glucose every 6 hour.   Palliative care on board.  DVT ppx: Subcu heparin  GI ppx: PPI    Discussed plan with Dr Spears and RN.    DVT prophylaxis:  Medical DVT prophylaxis orders are present.    CODE STATUS:   Level Of Support Discussed With: Health Care Surrogate  Code Status (Patient has no pulse and is not breathing): CPR (Attempt to Resuscitate)  Medical Interventions (Patient has pulse or is  breathing): Full Support      33-year-old male critically ill in the ICU with cardiac arrest, respiratory failure, anoxic brain injury.I spent 40 minutes of time for evaluation and management of this critically ill patient including review of chart, labs pertinent imaging available as well as medical decision making and discussion with physicians, staff.    Electronically signed by Anjel Ferreira DO, 12/15/23, 4:38 PM EST.

## 2023-12-15 NOTE — PLAN OF CARE
Goal Outcome Evaluation:           Progress: no change  Outcome Evaluation: Pt remains unresponsive to stimuli.  No cough/gag.  Able to maintian oxygenation on current ventilator settings.  Rhythm remains sinus.  Started on hypertonic saline and mannitol, monitoring electrolytes.  Able to titrate vasopressor medications down.  Good urine output via jackson catheter.  No additional stooling or secretions from O-G tube.  Absent bowel tones.  Family at bedside off/on throughout night, very distraught.  Asks staff to tell them/her if he's dead.  Staff unable to confirm/deny as official brain death testing not yet performed.  Family unable to focus during conversation and specific topic shifts quickly.

## 2023-12-16 NOTE — PLAN OF CARE
Goal Outcome Evaluation:   Pt has been on [facility] 24/450/+10 tonight. Was able to wean o2 to 50% but did not do SBT because peep is too high with no order to wean it.

## 2023-12-16 NOTE — PLAN OF CARE
Goal Outcome Evaluation:  Plan of Care Reviewed With: patient        Progress: no change  Outcome Evaluation: Patient is currently on ventilator settings of AC/VC rate 24 vt 450 peep 10 and 45% oxygen tolerating well. Will continue to monitor.

## 2023-12-16 NOTE — PROGRESS NOTES
[facility]     Progress Note    Patient Name: Kelechi Crawford  : 1990  MRN: [id number]  Primary Care Physician:  Stephanie Abel APRN  Date of admission: 2023    Subjective   Subjective       Chief Complaint:   Patient is critically ill in ICU with out of hospita cardiac arrest    Interim summary  Out of hospital arrest  Initially with spontaneous respirations and tolerating SBT, mid afternoon patient went apneic and required assist-control without spontaneous initiation of respiration  Repeat CT of brain was with worsening in cerebral edema and herniation    Over the past 24H  Continued on mannitol and hypertonic saline with protocol in place  Intubated in unit on levo and vaso; normothermic protocol in place  Remains off sedation  Continued on Unasyn  FMS placed due to multiple liquid stools    This morning,  Remains with no cough, gag or spontaneous respirations  Pupils are fixed and dilated  Immediate family at bedside -requesting to speak with social work or patient advocate  Extended family at bedside as well  Continues on Levophed and vasopressin  No stool output overnight  With 5 L of UOP x 24 hours; +2 L fluid balance since admission      Review of Systems   Unable to obtain due to patient's unresponsive status      Objective   Objective     Vitals:   Temp:  [97.6 °F (36.4 °C)-98.9 °F (37.2 °C)] 98.7 °F (37.1 °C)  Heart Rate:  [] 79  Resp:  [24] 24  BP: ()/(53-75) 101/58  FiO2 (%):  [45 %-60 %] 45 %    Physical Exam   Vital Signs Reviewed   General:  NAD. Young male, lying in bed.  HEENT:  PERRL, EOMI.    Neck:  No JVD, no thyromegaly  Lymph: no axillary, cervical, supraclavicular lymphadenopathy noted bilaterally  Chest:  Clear to auscultation bilaterally, no work of breathing noted mechanical ventilator  CV: RRR, no M/G/R, pulses 2+  Abd:  Soft, NT, ND, +BS  EXT:  no clubbing, no cyanosis, no edema  Neuro: Aox0, no cough, no gag, no pupillary response, no  response to pain or verbal stimuli, CN grossly intact, no focal deficits.  Skin: No rashes or lesions noted    Result Review    Result Review:  I have personally reviewed the results from the time of this admission to 12/16/2023 12:19 EST and agree with these findings:  []  Laboratory  []  Microbiology  []  Radiology  []  EKG/Telemetry   []  Cardiology/Vascular   []  Pathology  []  Old records  []  Other:  Most notable findings include:       Lab 12/16/23  0604 12/16/23  0037 12/15/23  2235 12/15/23  2025 12/15/23  1807 12/15/23  1254 12/15/23  0806 12/15/23  0609 12/15/23  0012 12/14/23  1945 12/14/23  1512 12/14/23  1238 12/14/23  1207 12/14/23  0556 12/14/23  0512 12/14/23  0509   WBC 10.61  --   --   --   --   --   --  7.70  --   --   --   --   --   --   --  9.67   HEMOGLOBIN 15.5  --   --   --   --   --   --  16.1  --   --   --  18.7*  --   --   --  16.6   HEMATOCRIT 47.5  --   --   --   --   --   --  49.4  --   --   --  54.7*  --   --   --  51.5*   PLATELETS 152  --   --   --   --   --   --  150  --   --   --   --   --   --   --  143   SODIUM 145 146* 144 143 145 144 143 141  141   < > 140  --   --   --  139   < >  --    SODIUM, ARTERIAL  --   --   --   --   --   --   --   --   --   --  140.4  --  141.2  --    < >  --    POTASSIUM  --   --  3.7 3.5  --   --  3.9 3.9  --  4.0  --   --   --  3.7  --   --    CHLORIDE  --   --  110* 110*  --   --  110* 108*  --  106  --   --   --  102  --   --    CO2  --   --  23.0 23.3  --   --  23.1 22.5  --  20.7*  --   --   --  14.6*  --   --    BUN  --   --  7 7  --   --  8 8  --  11  --   --   --  8  --   --    CREATININE  --   --  1.09 1.00  --   --  1.04 1.01  --  0.88  --   --   --  1.26  --   --    GLUCOSE  --   --  121* 122*  --   --  120* 122*  --  146*  --   --   --  338*  --   --    GLUCOSE, ARTERIAL  --   --   --   --   --   --   --   --   --   --  119*  --  82  --    < >  --    CALCIUM  --   --  8.3* 8.3*  --   --  8.2* 8.2*  --  8.4*  --   --   --  10.1  --   --     PHOSPHORUS 1.9*  --   --   --   --   --   --  3.5  --   --   --   --   --   --   --   --    TOTAL PROTEIN  --   --   --   --   --   --   --  6.2  --   --   --   --   --  5.7*  --   --    ALBUMIN  --   --   --   --   --   --   --  3.3*  --   --   --   --   --  3.2*  --   --    GLOBULIN  --   --   --   --   --   --   --  2.9  --   --   --   --   --  2.5  --   --     < > = values in this interval not displayed.         Microbiology Results (last 10 days)       Procedure Component Value - Date/Time    BAL Culture, Quantitative - Lavage, Lung, Right Lower Lobe [[id number]]  (Abnormal) Collected: 12/14/23 1443    Lab Status: Final result Specimen: Lavage from Lung, Right Lower Lobe Updated: 12/16/23 1016     BAL Culture >100,000 CFU/mL Streptococcus agalactiae (Group B)     Comment:   This organism is considered to be universally susceptible to penicillin.  No further antibiotic testing will be performed. If Clindamycin or Erythromycin is the drug of choice, notify the laboratory within 7 days to request susceptibility testing.         25,000 CFU/mL The culture consists of normal respiratory reginald. This is a preliminary report; final report to follow.     Gram Stain Moderate (3+) WBCs seen      Moderate (3+) Gram positive cocci in pairs and chains      Rare (1+) Gram negative bacilli    AFB Culture - Lavage, Lung [[id number]] Collected: 12/14/23 1443    Lab Status: Preliminary result Specimen: Lavage from Lung Updated: 12/15/23 0918     AFB Stain No acid fast bacilli seen on direct smear      No acid fast bacilli seen on concentrated smear    Pneumonia Panel - Lavage, Lung [[id number]]  (Abnormal) Collected: 12/14/23 1443    Lab Status: Final result Specimen: Lavage from Lung Updated: 12/14/23 1742     Escherichia coli PCR Not Detected     Acinetobacter calcoaceticus-baumannii complex PCR Not Detected     Enterobacter cloacae PCR Detected     Comment: 10^4 Bin copies/mL        Klebsiella oxytoca PCR Not Detected      Klebsiella pneumoniae group PCR Not Detected     Klebsiella aerogenes PCR Not Detected     Moraxella catarrhalis PCR Not Detected     Proteus species PCR Not Detected     Pseudomonas aeroginosa PCR Not Detected     Serratia marcescens PCR Not Detected     Staphylococcus aureus PCR Not Detected     Streptococcus pyogenes PCR Not Detected     Haemophilus influenzae PCR Not Detected     Streptococcus agalactiae PCR Detected     Comment: >=10^7 Bin copies/mL        Streptococcus pneumoniae PCR Not Detected     Chlamydophila pneumoniae PCR Not Detected     Legionella pneumophilia PCR Not Detected     Mycoplasma pneumo by PCR Not Detected     ADENOVIRUS, PCR Not Detected     CTX-M Gene Not Detected     IMP Gene Not Detected     KPC Gene Not Detected     mecA/C and MREJ Gene N/A     NDM Gene Not Detected     OXA-48-like Gene Not Detected     VIM Gene Not Detected     Coronavirus Not Detected     Human Metapneumovirus Not Detected     Human Rhinovirus/Enterovirus Not Detected     Influenza A PCR Not Detected     Influenza B PCR Not Detected     RSV, PCR Not Detected     Parainfluenza virus PCR Not Detected    Respiratory Culture - Sputum, ET Suction [[id number]] Collected: 12/14/23 0614    Lab Status: Final result Specimen: Sputum from ET Suction Updated: 12/16/23 1024     Respiratory Culture Moderate growth (3+) Normal respiratory reginald. No S. aureus or Pseudomonas aeruginosa detected. Final report.     Gram Stain Few (2+) WBCs seen      Few (2+) Gram positive cocci in pairs and clusters             Assessment & Plan   Assessment / Plan         Active Hospital Problems:  Active Hospital Problems    Diagnosis     **Cardiac arrest     Choking due to food in larynx     Acute respiratory failure with hypoxia and hypercapnia     Anoxic brain injury     Elevated troponin     High serum lactate     Group B streptococcal pneumonia      Impression:  Cardiac arrest  Acute hypoxic and hypercarbic respiratory failure on mechanical  ventilator  Suspect choking leading to cardiac arrest  Anoxic brain injury  Cerebral edema on CT  Severe acidosis  Obesity     Plan:  -Teleneurology on board.  With overall suspicion of poor prognosis for meaningful neurological recovery.  Recommended keeping sodium 1 .  Currently on mannitol and hypertonic saline  -Continues on hypertonic 3% saline infusion and mannitol  -Based on mechanical ventilation with AC/VC settings.    -Titrate FiO2 to maintain SpO2 90% or greater.  -Has been off of sedation since admission.  Pupils are bilaterally dilated and nonreactive.  No cough, gag or response to painful stimuli  -Maintain normothermia  -CT personally reviewed and discussed with neurologist.  Repeat CT scan with increase edema throughout the brain parenchyma resulting in uncal herniation and cerebellar tonsillar herniation  -Pressors: Continue on levo and vaso.  Continue to wean as tolerated keep MAP greater than 65  -Cont aspiration precautions. Keep HOB 30 deg.   -NG tube to suction  -Currently on cefepime.  Pneumonia panel positive for Streptococcus agalactiae and Enterococcus cloacae  -Continue to trend lactate to clearance  -A-line and central line in place  -Replace electrolytes PRN to keep K 4.0, Mag 2.0, Phos 4.0.  -Keep glucose 140-180 while in ICU. Cont SSI.  -Transfuse to keep Hgb >7  -DVT ppx: Heparin subcu  -GI ppx: PPI  -Lines: Peripheral IVs, left radial A-line, right IJ central line placed 12/13/2023    Updated family regarding patient's extremely poor prognosis with minimal chance of meaning recovering. *Family requesting to speak with social work or patient advocate today.  Bedside staff reaching out to social work and house supervisor    DVT prophylaxis:  Medical DVT prophylaxis orders are present.    CODE STATUS:   Level Of Support Discussed With: Health Care Surrogate  Code Status (Patient has no pulse and is not breathing): CPR (Attempt to Resuscitate)  Medical Interventions (Patient has  pulse or is breathing): Full Support    I, ISAMAR Mckeon spent 15 minutes in accordance with split shared billing.  Electronically signed by ISAMAR Crow, 12/16/23, 12:27 PM EST.    Patient is critically ill with respiratory failure require mechanical ventilator in the setting of cardiac arrest secondary to hypoxia from choking, anoxic brain injury. 35 minutes of critical care time was spent managing this patient, excluding procedures. Of this time, I, Dr. Silvia Spears, spent 20 minutes and ISAMAR Mckeon spent 15 minutes in accordance with split shared billing. This included personally reviewing all pertinent labs, imaging, microbiology and documentation. Also discussing the case with the patient and any available family, the admitting physician and any available ancillary staff. Electronically signed by Silvia Spears MD, 12/16/23, 3:46 PM EST.

## 2023-12-16 NOTE — PROGRESS NOTES
[facility]  [facility] Tele[facility] Consult Services    Routine Consult Follow-Up    Patient Name:   Kelechi Crawford  YOB: 1990  Identification Number:   MRN - [id number]  Date of Service:   12/16/2023 10:43:39    Diagnosis        G93.1 - Anoxic brain damage, not elsewhere classified    Impression  This is a 33-year-old man here status post cardiac arrest, downtime at least 30 minutes. Goal    FINDINGS:  - CT head - diffuse cerebral edema and evidence of herniation.  - MRI of the brain - diffuse anoxic injury and herniation.    DIAGNOSIS:  1. Diffuse anoxic injury and herniation; s/p cardiac arrest.    PLAN:  - Currently on mannitol and hypertonic saline.  - Sodium 145-155.  - Overall, suspect a poor prognosis for meaningful neurological recovery.  - Neurology will sign off.    Our recommendations are outlined below    Disposition :  Neurology will sign off. Reconsult if Needed.    Subjective  The patient was seen and examined.      Examination    Coma Exam:    Ventilator:  Yes  Sedated: No  Paralyzed: No  Breathes spontaneously above ventilator rate: No    Responds to Voice:  No    Responds to Sternal rub:  No    Withdraws limb from painful stimulation:  No    Spontaneous limb movement:   Right arm: No  Left arm: No  Right leg: No  Left leg: No    Pupils:  Fixed    Corneal Reflexes:  Absent    Oculocephalic Reflexes:  Absent    Cough/Gag Reflexes:  Absent          Patient/Family was informed the Neurology Consult would happen via TeleHealth consult by way of interactive audio and video telecommunications and consented to receiving care in this manner.    Telehealth Neurology consultation was provided. I spent 25 minutes providing telehealth care. This includes time spent for face to face visit via telemedicine, review of medical records, imaging studies and discussion of findings with providers, the patient and/or family.      Dr Adama Caban      [facility]  For Inpatient  follow-up with [facility] physician please call [facility] [phone]. This is not an outpatient service. Post hospital discharge, please contact hospital directly.    Please do not communicate with [facility] physicians via secure chat. If you have any questions, Please contact [facility].

## 2023-12-16 NOTE — PLAN OF CARE
Goal Outcome Evaluation:      No significant changes since dayshift; unresponsive to all stimuli. Patient on Levophed at 0.1, Vasopressin, Hypertonic IVF at 30mL/hr and LR at 75mL/hr. 0mL stool output and 2750mL urine.

## 2023-12-16 NOTE — PROGRESS NOTES
[facility]   Hospitalist Progress Note  Date: 2023  Patient Name: Kelechi Crawford  : 1990  MRN: [id number]  Date of admission: 2023      Subjective   Subjective     Chief Complaint: Follow up for cardiac arrest    Interval Followup:   On assist-control ventilation  FiO2 up to 100%.  PEEP of 10  On vasopressin/Levophed  No improvement in mentation  Teleneurology has signed off given poor prognosis    Review of Systems  UTO; intubated.  Unresponsive      Objective   Objective     Vitals:   Temp:  [97.6 °F (36.4 °C)-98.9 °F (37.2 °C)] 98.4 °F (36.9 °C)  Heart Rate:  [] 77  Resp:  [24] 24  BP: ()/(53-68) 89/68  FiO2 (%):  [45 %-80 %] 80 %  Physical Exam    Constitutional: Young male.  Critically ill. On mechanical ventilator   Eyes: Pupils dilated and fixed   HENT: OP with ET tube   Respiratory: Equal ventilated breath sounds bilaterally   Cardiovascular: Regular, tachycardic.   Neurologic: Unresponsive.  Does not withdraw to pain.  No spontaneous limb movement.  No cough or gag reflex   Skin: Extremities warm,  no mottling   : +jackson catheter    Result Review    Result Review:  I have personally reviewed the following over the last 24 hours (07:00 to 07:00) and agree with the following findings  [x]  Laboratory  CBC          2023    05:09 2023    12:38 12/15/2023    06:09 2023    06:04   CBC   WBC 9.67   7.70  10.61    RBC 5.18   5.04  4.74    Hemoglobin 16.6  18.7  16.1  15.5    Hematocrit 51.5  54.7  49.4  47.5    MCV 99.4   98.0  100.2    MCH 32.0   31.9  32.7    MCHC 32.2   32.6  32.6    RDW 15.9   13.0  13.4    Platelets 143   150  152      CMP          2023    05:12 2023    05:56 2023    12:07 2023    15:12 2023    19:45 12/15/2023    00:12 12/15/2023    06:09 12/15/2023    08:06 12/15/2023    12:54   CMP   Glucose 371  338  82  119  146   122  120     BUN  8    11   8  8     Creatinine  1.26    0.88   1.01  1.04      EGFR  77.2    116.4   100.7  97.2     Sodium 143.4  139  141.2  140.4  140  140  141     141  143  144    Potassium  3.7    4.0   3.9  3.9     Chloride  102    106   108  110     Calcium  10.1    8.4   8.2  8.2     Total Protein  5.7      6.2      Albumin  3.2      3.3      Globulin  2.5      2.9      Total Bilirubin  0.3      1.2      Alkaline Phosphatase  64      66      AST (SGOT)  67      38      ALT (SGPT)  62      61      Albumin/Globulin Ratio  1.3      1.1      BUN/Creatinine Ratio  6.3    12.5   7.9  7.7     Anion Gap  22.4    13.3   10.5  9.9           12/15/2023    18:07 12/15/2023    20:25 12/15/2023    22:35 12/16/2023    00:37 12/16/2023    06:04 12/16/2023    12:09 12/16/2023    12:34   CMP   Glucose  122  121    121  98    BUN  7  7    6     Creatinine  1.00  1.09    1.13     EGFR  101.9  91.9    88.0     Sodium 145  143  144  146  145  147     149  145.8    Potassium  3.5  3.7    3.5     Chloride  110  110    116     Calcium  8.3  8.3    8.5     Total Protein          Albumin          Globulin          Total Bilirubin          Alkaline Phosphatase          AST (SGOT)          ALT (SGPT)          Albumin/Globulin Ratio          BUN/Creatinine Ratio  7.0  6.4    5.3     Anion Gap  9.7  11.0    8.5       [x]  Microbiology  BAL culture  >100,000 CFU/mL Streptococcus agalactiae (Group B) Abnormal    [x]  Radiology   [x]  EKG/Telemetry monitor personally reviewed: Sinus tachycardia  [x]  Cardiology/Vascular   []  Pathology  []  Old records  [x]  Other:    Intake/Output Summary (Last 24 hours) at 12/16/2023 1612  Last data filed at 12/16/2023 0745  Gross per 24 hour   Intake 7434.3 ml   Output 5050 ml   Net 2384.3 ml         Assessment & Plan   Assessment / Plan     Assessment/Plan:  Active Hospital Problems    Diagnosis  POA    **Cardiac arrest [I46.9]  Yes    Choking due to food in larynx [T17.320A, W44.F3XA]  Unknown    Acute respiratory failure with hypoxia and hypercapnia [J96.01, J96.02]  Yes     Anoxic brain injury [G93.1]  Unknown    Elevated troponin [R79.89]  Yes    High serum lactate [R79.89]  Yes    Group B streptococcal pneumonia [J15.3]  Unknown         Continue ICU level care.  Appreciate intensivist assistance  MRI with findings consistent with anoxic brain injury, cerebellar tonsillar herniation and possibly uncal herniation noted.   CT cerebral perfusion scan pending  Teleneurology has signed off given poor prognosis  Continue hypertonic saline and mannitol. Goal sodium 145-155.  Monitor sodium levels every 6 hours  Continue Levophed and vasopressin to maintain MAP >65  Vent management per intensivist  Continue LR at 75 cc/h  Continue IV cefepime empirically   Monitor POC glucose every 6 hour.   Palliative care on board. GOC discussions ongoing.   SAVANAH representative in house to evaluate  DVT ppx: Subcu heparin  GI ppx: PPI    Discussed plan with Dr Spears and RN.    DVT prophylaxis:  Medical DVT prophylaxis orders are present.    CODE STATUS:   Level Of Support Discussed With: Health Care Surrogate  Code Status (Patient has no pulse and is not breathing): CPR (Attempt to Resuscitate)  Medical Interventions (Patient has pulse or is breathing): Full Support    33-year-old male critically ill in the ICU with cardiac arrest, respiratory failure, anoxic brain injury.I spent 31 minutes of time for evaluation and management of this critically ill patient including review of chart, labs pertinent imaging available as well as medical decision making and discussion with physicians, staff.    Electronically signed by Anjel Ferreira DO, 12/16/23, 4:12 PM EST.

## 2023-12-17 NOTE — PLAN OF CARE
Goal Outcome Evaluation:   Patient in VC/AC throughout the night. VT:450 RATE:24 Peep:10 FIO2:80%. Weaning trials held at this time per Maria Isabel PENN.

## 2023-12-17 NOTE — PLAN OF CARE
Goal Outcome Evaluation:         Pt is unresponsive during shift. Pupils fixed and dilated. No cough/gag, NSR, AC 24/450/10/80%. Pt has creamy thick secretions, BP has been softer side but remained on norepi 0.1 and vaso 0.04. Discussed with jose maria at beginning of shift to hold manntiol. Per juli(nurse effector) this am due to soft BP throughout night. Will hold mannitol again. Artic sun still in place, temp remained 98.6. Bowel sounds were heard during assessment. Hypertonic solution has been held all night. LR @75.

## 2023-12-17 NOTE — DISCHARGE SUMMARY
[facility]        HOSPITALIST  DEATH DISCHARGE SUMMARY    Patient Name: Kelechi Crawford  : 1990  MRN: [id number]    Date of Admission: 2023  Date of Death:  23  Time of Death: 16:10  Primary Care Physician: Stephanie Abel APRN    Consults       Date and Time Order Name Status Description    12/15/2023  6:36 AM Inpatient Neurology Consult Other (see comments) (JENNIFER)      2023  6:06 AM Hospitalist (on-call MD unless specified)      2023  6:02 AM Pulmonology (on-call MD unless specified) Completed             Discharge Diagnoses:  Anoxic brain injury  Uncal/cerebellar tonsillar herniation  Acute respiratory failure with hypoxia and hypercapnia  Cardiac arrest  Choking due to food in larynx  Shock; unspecified  Elevated troponin  High serum lactate  Group B streptococcus pneumonia    HospitalAnoxic brain injury Course     Hospital Course:  Kelechi Crawford is a 33 y.o. male who suffered cardiac arrest after choking on food at home.  Heimlich was attempted but unsuccessful and EMS was unable to intubate him despite multiple attempts.  Downtime was approximately 30 minutes.  On arrival to the hospital he was in PEA arrest, ACLS was run for 30 to 40 minutes. Initial labs show severe acidosis ABG 6.7 9/124/62.  Lactic acid 12.4.  Repeat lactate 12.1.  CT chest largely unremarkable other than some atelectasis.  CT head showed concern for diffuse cerebral edema.  He was admitted to the ICU on mechanical ventilation and vasopressors.  He was placed on hypertonic saline and mannitol.  Repeat CT of the brain showed worsening edema and herniation.  Nuclear medicine brain scan showed no evidence of intracranial activity consistent with brain death.  Goals of care addressed with his mother who was POA.  She ultimately decided to transition to comfort care measures.  He was terminally extubated and passed on 2023.    Discharge  Disposition:  [facility]jorie    Electronically signed by Anjel Ferreira DO, 12/17/23, 4:58 PM EST.

## 2023-12-17 NOTE — PLAN OF CARE
Goal Outcome Evaluation:      Patient transitioned to comfort care/ terminal withdrawal around 1600. Family was at bedside. Prior to the transition, SAVANAH was present on floor earlier today. Family made it very clear that they did not want to speak with SAVANAH. They were aware that there was a meeting planned but they had figured out because they were of medical background that it was going to be related to organ donation and told RN that they would not be meeting with him and to tell him NO, they would not be donating any organs. SAVANAH was not able to speak with family because they did not want to be approached by SAVANAH. Patient cardiac TOD was 1610. Patient Nuc Med scan brain death was 0951.                   Problem: Skin Injury Risk Increased  Goal: Skin Health and Integrity  Outcome: Unable to Meet, Plan Revised     Problem: Fall Injury Risk  Goal: Absence of Fall and Fall-Related Injury  Outcome: Unable to Meet, Plan Revised     Problem: Communication Impairment (Mechanical Ventilation, Invasive)  Goal: Effective Communication  Outcome: Unable to Meet, Plan Revised     Problem: Device-Related Complication Risk (Mechanical Ventilation, Invasive)  Goal: Optimal Device Function  Outcome: Unable to Meet, Plan Revised     Problem: Inability to Wean (Mechanical Ventilation, Invasive)  Goal: Mechanical Ventilation Liberation  Outcome: Unable to Meet, Plan Revised     Problem: Nutrition Impairment (Mechanical Ventilation, Invasive)  Goal: Optimal Nutrition Delivery  Outcome: Unable to Meet, Plan Revised     Problem: Skin and Tissue Injury (Mechanical Ventilation, Invasive)  Goal: Absence of Device-Related Skin and Tissue Injury  Outcome: Unable to Meet, Plan Revised     Problem: Ventilator-Induced Lung Injury (Mechanical Ventilation, Invasive)  Goal: Absence of Ventilator-Induced Lung Injury  Outcome: Unable to Meet, Plan Revised     Problem: Gas Exchange Impaired  Goal: Optimal Gas Exchange  Outcome: Unable to Meet, Plan  Revised     Problem: Adjustment to Illness (Sepsis/Septic Shock)  Goal: Optimal Coping  Outcome: Unable to Meet, Plan Revised     Problem: Bleeding (Sepsis/Septic Shock)  Goal: Absence of Bleeding  Outcome: Unable to Meet, Plan Revised     Problem: Glycemic Control Impaired (Sepsis/Septic Shock)  Goal: Blood Glucose Level Within Desired Range  Outcome: Unable to Meet, Plan Revised     Problem: Infection Progression (Sepsis/Septic Shock)  Goal: Absence of Infection Signs and Symptoms  Outcome: Unable to Meet, Plan Revised     Problem: Nutrition Impaired (Sepsis/Septic Shock)  Goal: Optimal Nutrition Intake  Outcome: Unable to Meet, Plan Revised     Problem: Adult Inpatient Plan of Care  Goal: Plan of Care Review  Outcome: Unable to Meet, Plan Revised

## 2023-12-17 NOTE — PROGRESS NOTES
[facility]     Progress Note    Patient Name: Kelechi Crawford  : 1990  MRN: [id number]  Primary Care Physician:  Stephanie Abel APRN  Date of admission: 2023    Subjective   Subjective       Chief Complaint:   Patient is critically ill in ICU with out of hospita cardiac arrest    Interim summary  Out of hospital arrest  Initially with spontaneous respirations and tolerating SBT, mid afternoon patient went apneic and required assist-control without spontaneous initiation of respiration  Repeat CT of brain was with worsening in cerebral edema and herniation    Over the past 24H  Remains with no cough, gag or spontaneous respirations  Pupils are fixed and dilated  family at bedside as well  Continues on Levophed and vasopressin  No stool output overnight  With 5 L of UOP x 24 hours; +2 L fluid balance since admission  Continues on mannitol and hypertonic saline    Overnight  Mannitol held due to Soft blood pressures  Hypertonic saline placed on hold as sodium was 153      Today  Continues intubated mechanical ventilator  Mannitol not given due to hypotension  Sodium 151  Remains with no calf, gag or spontaneous respiration.  Pupils are fixed and dilated  Family at bedside  Nuc med brain scan complete vascular flow performed this a.m. with no evidence of intracranial activity consistent with cerebral anoxia/brain death      Review of Systems   Unable to obtain due to patient's unresponsive status      Objective   Objective     Vitals:   Temp:  [98.2 °F (36.8 °C)-98.7 °F (37.1 °C)] 98.6 °F (37 °C)  Heart Rate:  [75-88] 84  Resp:  [24] 24  BP: ()/(52-89) 106/72  FiO2 (%):  [45 %-80 %] 80 %    Physical Exam   Vital Signs Reviewed   General:  NAD.  Young male, lying in bed  HEENT:  PERRL, EOMI.    Neck:  No JVD, no thyromegaly  Lymph: no axillary, cervical, supraclavicular lymphadenopathy noted bilaterally  Chest:  Clear to auscultation bilaterally, no work of breathing noted  CV: RRR,  no M/G/R, pulses 2+  Abd:  Soft, NT, ND, +BS  EXT:  no clubbing, no cyanosis, no edema  Neuro:  A&Ox0; no cough, no gag, no pupillary response, no response to pain or verbal stimuli; exam consistent with brain death, CN grossly intact, no focal deficits.  Skin: No rashes or lesions noted    Result Review    Result Review:  I have personally reviewed the results from the time of this admission to 12/17/2023 07:30 EST and agree with these findings:  []  Laboratory  []  Microbiology  []  Radiology  []  EKG/Telemetry   []  Cardiology/Vascular   []  Pathology  []  Old records  []  Other:  Most notable findings include:       Lab 12/17/23  0610 12/17/23  0414 12/17/23  0017 12/16/23  2022 12/16/23  1753 12/16/23  1234 12/16/23  1209 12/16/23  0604 12/16/23  0037 12/15/23  2235 12/15/23  2025 12/15/23  1254 12/15/23  0806 12/15/23  0609 12/15/23  0012 12/14/23  1945 12/14/23  1512 12/14/23  1238 12/14/23  1207 12/14/23  0556 12/14/23  0512 12/14/23  0509   WBC 10.08  --   --   --   --   --   --  10.61  --   --   --   --   --  7.70  --   --   --   --   --   --   --  9.67   HEMOGLOBIN 14.8  --   --   --   --   --   --  15.5  --   --   --   --   --  16.1  --   --   --  18.7*  --   --   --  16.6   HEMATOCRIT 47.0  --   --   --   --   --   --  47.5  --   --   --   --   --  49.4  --   --   --  54.7*  --   --   --  51.5*   PLATELETS 126*  --   --   --   --   --   --  152  --   --   --   --   --  150  --   --   --   --   --   --   --  143   SODIUM 151*  --  153* 153* 150*  --  147*  149* 145   < > 144 143   < > 143 141  141   < > 140  --   --   --  139   < >  --    SODIUM, ARTERIAL  --   --   --   --   --  145.8  --   --   --   --   --   --   --   --   --   --    < >  --    < >  --    < >  --    POTASSIUM  --   --   --  3.4*  --   --  3.5  --   --  3.7 3.5  --  3.9 3.9  --  4.0  --   --   --  3.7   < >  --    CHLORIDE  --   --   --  120*  --   --  116*  --   --  110* 110*  --  110* 108*  --  106  --   --   --  102   < >  --     CO2  --   --   --  22.7  --   --  22.5  --   --  23.0 23.3  --  23.1 22.5  --  20.7*  --   --   --  14.6*   < >  --    BUN  --   --   --  5*  --   --  6  --   --  7 7  --  8 8  --  11  --   --   --  8   < >  --    CREATININE  --   --   --  1.19  --   --  1.13  --   --  1.09 1.00  --  1.04 1.01  --  0.88  --   --   --  1.26   < >  --    GLUCOSE  --   --   --  115*  --   --  121*  --   --  121* 122*  --  120* 122*  --  146*  --   --   --  338*   < >  --    GLUCOSE, ARTERIAL  --   --   --   --   --  98  --   --   --   --   --   --   --   --   --   --    < >  --    < >  --    < >  --    CALCIUM  --   --   --  8.6  --   --  8.5*  --   --  8.3* 8.3*  --  8.2* 8.2*  --  8.4*  --   --   --  10.1   < >  --    PHOSPHORUS  --  4.1  --   --   --   --   --  1.9*  --   --   --   --   --  3.5  --   --   --   --   --   --   --   --    TOTAL PROTEIN 6.2  --   --   --   --   --   --   --   --   --   --   --   --  6.2  --   --   --   --   --  5.7*  --   --    ALBUMIN 3.1*  --   --   --   --   --   --   --   --   --   --   --   --  3.3*  --   --   --   --   --  3.2*  --   --    GLOBULIN  --   --   --   --   --   --   --   --   --   --   --   --   --  2.9  --   --   --   --   --  2.5  --   --     < > = values in this interval not displayed.         Microbiology Results (last 10 days)       Procedure Component Value - Date/Time    BAL Culture, Quantitative - Lavage, Lung, Right Lower Lobe [[id number]]  (Abnormal) Collected: 12/14/23 1443    Lab Status: Final result Specimen: Lavage from Lung, Right Lower Lobe Updated: 12/16/23 1016     BAL Culture >100,000 CFU/mL Streptococcus agalactiae (Group B)     Comment:   This organism is considered to be universally susceptible to penicillin.  No further antibiotic testing will be performed. If Clindamycin or Erythromycin is the drug of choice, notify the laboratory within 7 days to request susceptibility testing.         25,000 CFU/mL The culture consists of normal respiratory reginald. This is a  preliminary report; final report to follow.     Gram Stain Moderate (3+) WBCs seen      Moderate (3+) Gram positive cocci in pairs and chains      Rare (1+) Gram negative bacilli    AFB Culture - Lavage, Lung [[id number]] Collected: 12/14/23 1443    Lab Status: Preliminary result Specimen: Lavage from Lung Updated: 12/15/23 0918     AFB Stain No acid fast bacilli seen on direct smear      No acid fast bacilli seen on concentrated smear    Pneumonia Panel - Lavage, Lung [[id number]]  (Abnormal) Collected: 12/14/23 1443    Lab Status: Final result Specimen: Lavage from Lung Updated: 12/14/23 1742     Escherichia coli PCR Not Detected     Acinetobacter calcoaceticus-baumannii complex PCR Not Detected     Enterobacter cloacae PCR Detected     Comment: 10^4 Bin copies/mL        Klebsiella oxytoca PCR Not Detected     Klebsiella pneumoniae group PCR Not Detected     Klebsiella aerogenes PCR Not Detected     Moraxella catarrhalis PCR Not Detected     Proteus species PCR Not Detected     Pseudomonas aeroginosa PCR Not Detected     Serratia marcescens PCR Not Detected     Staphylococcus aureus PCR Not Detected     Streptococcus pyogenes PCR Not Detected     Haemophilus influenzae PCR Not Detected     Streptococcus agalactiae PCR Detected     Comment: >=10^7 Bin copies/mL        Streptococcus pneumoniae PCR Not Detected     Chlamydophila pneumoniae PCR Not Detected     Legionella pneumophilia PCR Not Detected     Mycoplasma pneumo by PCR Not Detected     ADENOVIRUS, PCR Not Detected     CTX-M Gene Not Detected     IMP Gene Not Detected     KPC Gene Not Detected     mecA/C and MREJ Gene N/A     NDM Gene Not Detected     OXA-48-like Gene Not Detected     VIM Gene Not Detected     Coronavirus Not Detected     Human Metapneumovirus Not Detected     Human Rhinovirus/Enterovirus Not Detected     Influenza A PCR Not Detected     Influenza B PCR Not Detected     RSV, PCR Not Detected     Parainfluenza virus PCR Not Detected     Respiratory Culture - Sputum, ET Suction [[id number]] Collected: 12/14/23 0614    Lab Status: Final result Specimen: Sputum from ET Suction Updated: 12/16/23 1024     Respiratory Culture Moderate growth (3+) Normal respiratory reginald. No S. aureus or Pseudomonas aeruginosa detected. Final report.     Gram Stain Few (2+) WBCs seen      Few (2+) Gram positive cocci in pairs and clusters             Assessment & Plan   Assessment / Plan         Active Hospital Problems:  Active Hospital Problems    Diagnosis     **Cardiac arrest     Choking due to food in larynx     Acute respiratory failure with hypoxia and hypercapnia     Anoxic brain injury     Elevated troponin     High serum lactate     Group B streptococcal pneumonia      Impression:  Cardiac arrest  Acute hypoxic and hypercarbic respiratory failure on mechanical ventilator  Suspect choking leading to cardiac arrest  Anoxic brain injury  Cerebral edema on CT  Severe acidosis  Obesity     Plan:  -Teleneurology on board.  With overall suspicion of poor prognosis for meaningful neurological recovery.  Recommended keeping sodium 145-155.  -Mannitol on hold due to hypotension   -Hypertonic saline not administered.  Patient with sodium of 151  -Continue on mechanical ventilation with AC/VC settings.    -Titrate FiO2 to maintain SpO2 90% or greater.  -Has been off of sedation since admission.  Pupils are bilaterally dilated and nonreactive.  No cough, gag or response to painful stimuli  -Maintain normothermia  -CT personally reviewed and discussed with neurologist.  Repeat CT scan with increase edema throughout the brain parenchyma resulting in uncal herniation and cerebellar tonsillar herniation  -Pressors: Continue on levo and vaso.  Continue to wean as tolerated keep MAP greater than 65  -Cont aspiration precautions. Keep HOB 30 deg.   -NG tube to suction  -Currently on cefepime.  Pneumonia panel positive for Streptococcus agalactiae and Enterococcus cloacae  -Continue  to trend lactate to clearance  -A-line and central line in place  -Replace electrolytes PRN to keep K 4.0, Mag 2.0, Phos 4.0.  -Keep glucose 140-180 while in ICU. Cont SSI.  -Transfuse to keep Hgb >7  -DVT ppx: Heparin subcu  -GI ppx: PPI  -Lines: Peripheral IVs, left radial A-line, right IJ central line placed 12/13/2023    Updated family regarding patient's extremely poor prognosis with minimal chance of meaning recovering.  Nuc med brain scan complete without vascular flow.  No evidence of intracranial activity consistent with cerebral anoxia/brain death.  Discussed findings with patient's family.  Offered emotional support, answered all questions.  Patient's mother is going to discuss with extended family.  Will likely to proceed forward with withdrawal of care within the next 24 to 48 hours.    DVT prophylaxis:  Medical DVT prophylaxis orders are present.    CODE STATUS:   Level Of Support Discussed With: Health Care Surrogate  Code Status (Patient has no pulse and is not breathing): CPR (Attempt to Resuscitate)  Medical Interventions (Patient has pulse or is breathing): Full Support    Maria Isabel MERA PA spent 15 minutes in accordance with [employer] shared billing.  Electronically signed by ISAMAR Crow, 12/17/23, 1:04 PM EST.    Patient is critically ill with respiratory failure require mechanical ventilator in the setting of cardiac arrest secondary to hypoxia from choking, anoxic brain injury. 35 minutes of critical care time was spent managing this patient, excluding procedures. Of this time, Dr. Silvia MERA, spent 20 minutes and ISAMAR Mckeon spent 15 minutes in accordance with [employer] shared billing. This included personally reviewing all pertinent labs, imaging, microbiology and documentation. Also discussing the case with the patient and any available family, the admitting physician and any available ancillary staff.

## 2023-12-17 NOTE — PLAN OF CARE
Goal Outcome Evaluation:              Outcome Evaluation: Patient is still on vent settings ACVC 24, 450, 80%, +10. The weaning trial was not performed this am as patient is on too much peep & fio2. Patient had no secretions this am.

## 2023-12-19 LAB — GLUCOSE BLDC GLUCOMTR-MCNC: 173 MG/DL (ref 70–99)

## 2023-12-20 LAB — FUNGUS WND CULT: ABNORMAL

## 2023-12-21 LAB
MYCOBACTERIUM SPEC CULT: NORMAL
NIGHT BLUE STAIN TISS: NORMAL
NIGHT BLUE STAIN TISS: NORMAL

## 2023-12-28 LAB
MYCOBACTERIUM SPEC CULT: NORMAL
NIGHT BLUE STAIN TISS: NORMAL
NIGHT BLUE STAIN TISS: NORMAL

## 2024-01-04 LAB
MYCOBACTERIUM SPEC CULT: NORMAL
NIGHT BLUE STAIN TISS: NORMAL
NIGHT BLUE STAIN TISS: NORMAL

## 2024-01-11 LAB
MYCOBACTERIUM SPEC CULT: NORMAL
NIGHT BLUE STAIN TISS: NORMAL
NIGHT BLUE STAIN TISS: NORMAL

## 2024-01-17 LAB — FUNGUS WND CULT: ABNORMAL

## 2024-01-18 LAB
MYCOBACTERIUM SPEC CULT: NORMAL
NIGHT BLUE STAIN TISS: NORMAL
NIGHT BLUE STAIN TISS: NORMAL

## 2024-01-25 LAB
MYCOBACTERIUM SPEC CULT: NORMAL
NIGHT BLUE STAIN TISS: NORMAL
NIGHT BLUE STAIN TISS: NORMAL